# Patient Record
Sex: MALE | Race: WHITE | NOT HISPANIC OR LATINO | URBAN - METROPOLITAN AREA
[De-identification: names, ages, dates, MRNs, and addresses within clinical notes are randomized per-mention and may not be internally consistent; named-entity substitution may affect disease eponyms.]

---

## 2019-04-18 ENCOUNTER — TRANSFERRED RECORDS (OUTPATIENT)
Dept: HEALTH INFORMATION MANAGEMENT | Facility: CLINIC | Age: 44
End: 2019-04-18

## 2019-10-15 ENCOUNTER — TRANSFERRED RECORDS (OUTPATIENT)
Dept: HEALTH INFORMATION MANAGEMENT | Facility: CLINIC | Age: 44
End: 2019-10-15

## 2019-12-20 ENCOUNTER — TRANSFERRED RECORDS (OUTPATIENT)
Dept: HEALTH INFORMATION MANAGEMENT | Facility: CLINIC | Age: 44
End: 2019-12-20

## 2021-11-04 ENCOUNTER — TRANSFERRED RECORDS (OUTPATIENT)
Dept: HEALTH INFORMATION MANAGEMENT | Facility: CLINIC | Age: 46
End: 2021-11-04

## 2024-04-11 ENCOUNTER — TRANSFERRED RECORDS (OUTPATIENT)
Dept: HEALTH INFORMATION MANAGEMENT | Facility: CLINIC | Age: 49
End: 2024-04-11

## 2024-04-17 ENCOUNTER — EXTERNAL ORDER RESULTS (OUTPATIENT)
Dept: LAB | Facility: CLINIC | Age: 49
End: 2024-04-17

## 2024-05-16 ENCOUNTER — TRANSFERRED RECORDS (OUTPATIENT)
Dept: HEALTH INFORMATION MANAGEMENT | Facility: CLINIC | Age: 49
End: 2024-05-16

## 2024-05-21 ENCOUNTER — MEDICAL CORRESPONDENCE (OUTPATIENT)
Dept: HEALTH INFORMATION MANAGEMENT | Facility: CLINIC | Age: 49
End: 2024-05-21

## 2024-05-21 ENCOUNTER — REFERRAL (OUTPATIENT)
Dept: TRANSPLANT | Facility: CLINIC | Age: 49
End: 2024-05-21

## 2024-05-21 DIAGNOSIS — Z76.82 ORGAN TRANSPLANT CANDIDATE: ICD-10-CM

## 2024-05-21 DIAGNOSIS — Z76.82 AWAITING ORGAN TRANSPLANT: Primary | ICD-10-CM

## 2024-05-21 DIAGNOSIS — Z01.818 ENCOUNTER FOR PRE-TRANSPLANT EVALUATION FOR KIDNEY AND PANCREAS TRANSPLANT: ICD-10-CM

## 2024-05-21 DIAGNOSIS — N18.5 CHRONIC KIDNEY DISEASE, STAGE V (H): ICD-10-CM

## 2024-05-21 DIAGNOSIS — I10 ESSENTIAL HYPERTENSION: ICD-10-CM

## 2024-05-21 DIAGNOSIS — N18.6 END STAGE RENAL DISEASE (H): ICD-10-CM

## 2024-05-21 DIAGNOSIS — E10.9 TYPE 1 DIABETES MELLITUS WITHOUT COMPLICATION (H): ICD-10-CM

## 2024-05-21 NOTE — LETTER
Saul Metzger  [address] [address]    Dear Saul,    Thank you for your interest in the Transplant Center at [facility]. We look forward to being a part of your care team and assisting you through the transplant process.    As we discussed, your transplant coordinator is Debby Gotti (Pancreas, Kidney).  You may call your coordinator at any time with questions or concerns.  Your first scheduled call will be on 6/11/2024 between 12-3pm.  If this needs to change, call [phone].    Please complete the following.    Fill out and return the enclosed forms  Authorization for Electronic Communication  Authorization to Discuss Protected Health Information  Authorization for Release of Protected Health Information    Sign up for:  Zoomt, access to your electronic medical record (see enclosed pamphlet)  [facility].CupomNow, a transplant education website    You can use these tools to learn more about your transplant, communicate with your care team, and track your medical details           My Transplant Place            Sincerely,      Solid Organ Transplant  [facility]    cc: Referring Physician PCP

## 2024-05-23 ENCOUNTER — TRANSFERRED RECORDS (OUTPATIENT)
Dept: HEALTH INFORMATION MANAGEMENT | Facility: CLINIC | Age: 49
End: 2024-05-23
Payer: COMMERCIAL

## 2024-05-24 ENCOUNTER — TRANSFERRED RECORDS (OUTPATIENT)
Dept: HEALTH INFORMATION MANAGEMENT | Facility: CLINIC | Age: 49
End: 2024-05-24
Payer: COMMERCIAL

## 2024-05-24 VITALS — WEIGHT: 149 LBS | BODY MASS INDEX: 22.58 KG/M2 | HEIGHT: 68 IN

## 2024-05-24 NOTE — TELEPHONE ENCOUNTER
SOT KIDNEY INTAKE   May 24, 2024 5:28 PM - Susie Bai LPN:     PCP: Surjit Pro MD    Referring Organization: [facility]   Referring Provider: Stuart Reid MD  Referring Diagnosis: CKD stage 5, DM1    GFR/Date: pending lab record retrieval     Is patient diabetic? yes   Is patient on insulin? yes  Is patient under the age of 65? yes  Was patient offered a pancreas transplant referral? yes    Previous transplants No  Cancer history: No  Cardiac history: No  Biopsy: Not found   Oxygen use at rest: 0 with activity: 0    BMI: 22.66     Is patient in a group home/assisted living? No  Does patient have a guardian? No    Referral intake process completed.  Patient is aware that after financial approval is received, medical records will be requested.   Patient confirmed for a callback from transplant coordinator on 6/11/2024. (within 2 weeks)  Tentative evaluation date 8/21/2024 slot 2 .    Confirmed coordinator will discuss evaluation process in more detail at the time of their call.   Patient is aware of the need to arrange age appropriate cancer screening, vaccinations, and dental care.  Reminded patient to complete questionnaire, complete medical records release, and review packet prior to evaluation visit .  Assessed patient for special needs (ie-wheelchair, assistance, guardian, and ):  None    Patient instructed to call [phone] with questions.     Patient gave verbal consent during intake call to obtain medical records and documents outside of [facility]:  Yes

## 2024-06-06 ENCOUNTER — TRANSFERRED RECORDS (OUTPATIENT)
Dept: HEALTH INFORMATION MANAGEMENT | Facility: CLINIC | Age: 49
End: 2024-06-06
Payer: COMMERCIAL

## 2024-06-11 ENCOUNTER — TELEPHONE (OUTPATIENT)
Dept: TRANSPLANT | Facility: CLINIC | Age: 49
End: 2024-06-11
Payer: COMMERCIAL

## 2024-06-11 ENCOUNTER — TRANSFERRED RECORDS (OUTPATIENT)
Dept: HEALTH INFORMATION MANAGEMENT | Facility: CLINIC | Age: 49
End: 2024-06-11
Payer: COMMERCIAL

## 2024-06-11 NOTE — TELEPHONE ENCOUNTER
Reviewed pt's chart for pre-kidney/pancreas transplant evaluation planning. Coordinator first call on 6/11/24. PKE STD on Wednesday 8/21/2024 slot #2.      services required: no. Is pt able to attend virtual education class? Has active MyChart    Pt has ESRD d/t DM 1, biopsy none.  Pt is on dialysis, HD recently started mid/end of May, requesting 9528. Pt is type 1 diabetic, on insulin using 6-10 units 2x daily. 30 years+ 6-10 units 2x     *requesting records from [facility] to verify  Health hx: none.    Heart hx: HTN.    Lung hx: none.   Surgical hx: none - HD line placement.      Pt is not/never smoker, does not consume alcohol, and does not use recreational drugs. Does not have current infection,  non-healing wounds, or active cancer.    Health maintenance items: BMI 22.7 on 5/24/24.  Colonoscopy: n/a.  Dental: due.  Vaccinations: UTD.     Pt is independent w/ ADLs.  Pt lives in [place], [place] w/ family.  Has support following transplant. Pt does not have living donors.     Imaging Available: requesting records from [facility]    Contacted patient and introduced myself as their Transplant Coordinator, also introduced the role of the Transplant Coordinator in the transplant process.  Explained the purpose of this call including reviewing next steps and answering questions.      Confirmed Referring Provider, Dr. Prince Raza; Dialysis Center, [facility]; and Primary Care Physician, Dr. Surjit Pro. Explained to the patient of the importance of continued communication with referring providers and primary care physicians.      Reviewed components of transplant evaluation process including necessary appointments, tests, and procedures.  Instructed pt to bring 1-2 people with them to eval and to eat and drink and normally on eval day    Answered questions for patient regarding evaluation, provided my name and contact information and requested they call/message with any additional questions or  concerns.  Informed patient they will receive a letter with information discussed in referral call. Determined that patient would like information regarding transplant by:       Mail, Email, MyChart, and/or Phone Call.  Encouraged the use of APImetricshart.    Informed pt about transplant educational website, asked to watch pre-k/p or sign up for education class prior to evaluation. Link for educational videos were provided.  Additional informational web sites about transplant were discussed. Links provided to www.unos.org and www.srtr.org in letter sent to patient.  Pt expressed understanding of all and were in agreement with the plan.    Confirmed STD Wednesday, August 21 slot #2. Informed pt they will hear from scheduling to arrange appointment times for evaluation day. As well as, receive an email from our Office prior to eval with a Receipt of Info and educational materials - instructed to read materials and sign consent prior to eval. Smartset orders entered.

## 2024-06-14 ENCOUNTER — DOCUMENTATION ONLY (OUTPATIENT)
Dept: TRANSPLANT | Facility: CLINIC | Age: 49
End: 2024-06-14
Payer: COMMERCIAL

## 2024-06-19 ENCOUNTER — DOCUMENTATION ONLY (OUTPATIENT)
Dept: TRANSPLANT | Facility: CLINIC | Age: 49
End: 2024-06-19
Payer: COMMERCIAL

## 2024-07-11 ENCOUNTER — TRANSFERRED RECORDS (OUTPATIENT)
Dept: HEALTH INFORMATION MANAGEMENT | Facility: CLINIC | Age: 49
End: 2024-07-11
Payer: COMMERCIAL

## 2024-07-26 ENCOUNTER — TRANSFERRED RECORDS (OUTPATIENT)
Dept: HEALTH INFORMATION MANAGEMENT | Facility: CLINIC | Age: 49
End: 2024-07-26
Payer: COMMERCIAL

## 2024-07-28 ENCOUNTER — HEALTH MAINTENANCE LETTER (OUTPATIENT)
Age: 49
End: 2024-07-28

## 2024-08-09 ENCOUNTER — TRANSFERRED RECORDS (OUTPATIENT)
Dept: HEALTH INFORMATION MANAGEMENT | Facility: CLINIC | Age: 49
End: 2024-08-09
Payer: COMMERCIAL

## 2024-08-14 ENCOUNTER — TELEPHONE (OUTPATIENT)
Dept: TRANSPLANT | Facility: CLINIC | Age: 49
End: 2024-08-14
Payer: COMMERCIAL

## 2024-08-14 NOTE — TELEPHONE ENCOUNTER
Called pt and left VM confirming PKE for 8/21/24, provided clinic address, check in time, instructed to take medications, no need to fast and bring support person. Provided office line for any follow up questions.

## 2024-08-15 ENCOUNTER — TRANSFERRED RECORDS (OUTPATIENT)
Dept: HEALTH INFORMATION MANAGEMENT | Facility: CLINIC | Age: 49
End: 2024-08-15
Payer: COMMERCIAL

## 2024-08-20 LAB
A1 AB TITR SERPL: 8 {TITER}
A1 AB TITR SERPL: 8 {TITER}
A2 AB TITR SERPL: 1 {TITER}
A2 AB TITR SERPL: <1 {TITER}
ABO/RH(D): NORMAL
ABO/RH(D): NORMAL
ANTIBODY SCREEN: NEGATIVE
ANTIBODY TITER IGM SCREEN: NEGATIVE
SPECIMEN EXPIRATION DATE: NORMAL

## 2024-08-20 NOTE — PROGRESS NOTES
[facility] SOLID ORGAN TRANSPLANT  OUTPATIENT MNT: KIDNEY PANCREAS TRANSPLANT EVALUATION    Current BMI: 22.4 (HT 69 in,  lbs/69 kg)  BMI guideline for kidney transplant up to a BMI of 40 / per surgeon discretion  BMI guideline for pancreas transplant up to a BMI of 35 / per surgeon discretion    Frailty Assessment-- Frail (3/5 points)- unintentional wt loss (pt is regaining), low , low activity (although no reported physical limitations)  **Pt does not appear frail   Handgrip Strength: 25    Reference:  Score of 0-2 = Not Frail  Score of 3-5 = Frail      TIME SPENT: 15 minutes  VISIT TYPE: Initial   REFERRING PHYSICIAN: Savanna   PT ACCOMPANIED BY: his mom     History of previous txp: none   Dialysis: HD 5/2024 930 am     NUTRITION ASSESSMENT  H/o DM I- checks BG via CGM (ranges from 70-low 200s)  Feels low ~50 but CGM alerts before this  Lantus- has been off for a few years d/t lower BG levels.   Humalog- 6 units/meal on the low end, up to 10 units/meal      Last A1c: 6.6 (4/17/24); 5.9 this summer per pt     - Meal prep & grocery shopping: pt's mom- lives with mom   - Previous RD education: yes  - Appetite: improved over time since being ill lately  - Food allergies/intolerances: no  - Issues chewing or swallowing: no  - N/V/D/C: no  - Food access concerns: not asked     Vitamins, Supplements, Pertinent Meds: Phoslo (takes majority of the time), renal vit, vit D  Herbal Medicines/Supplements: none   Protein Supplements: Fair Life protein drinks after dialysis 3/week    Weight hx // fluid retention:   - no KOLBY  - prev 170, down to 140s with covid but has since regained >10 lbs   - unable to verify wt hx records due to lack of recent records in chart    Wt Readings from Last 10 Encounters:   08/21/24 69 kg (152 lb 3.2 oz)   05/24/24 67.6 kg (149 lb)     PHYSICAL ACTIVITY  No routine activity; no physical limitations     DIET RECALL  Breakfast HD days- cereal w/ whole milk   Lunch Lunch meat s/w;  grilled cheese; grilled PB s/w; canned chix and dumpling soup   Dinner Chicken w/ mashed potatoes; usually has protein at dinner; potato sometimes or rice/noodles   No veggies- some corn, gr beans, beets   Snacks Chips, cookies    Beverages Water, 1 pop/day at the most (Diet Sprite or Dew), milk (24 oz/day)   Alcohol None    Dining out 3x/week      LABS  No recent K/Phos on file since starting dialysis, but pt reports previously high levels have come down to within range    NUTRITION DIAGNOSIS  No nutrition diagnosis identified at this time.     NUTRITION INTERVENTION  Nutrition education provided:  Discussed sodium intake (low sodium foods and drinks, seasoning food without salt and tips for low sodium diet).  Reviewed K/Phos, protein needs being on dialysis.     Reviewed post txp diet guidelines in brief (will review in further detail post txp):  (1) Review of proper food safety measures d/t immunosuppressant therapy post-op and increased risk for food-borne illness    (2) Avoid the following post txp d/t risk for rejection, unknown effects on the organs, and/or potential interactions with immunosuppressants:  - Herbal, Chinese, holistic, chiropractic, natural, alternative medicines and supplements  - Detoxes and cleanses  - Weight loss pills  - Protein powders or other products with extracts or herbs (ie green tea extract)    (3) Med regimen and possible side effects    Patient Understanding: Pt verbalized understanding of education provided.  Expected Engagement: Good  Follow-Up Plans: PRN     NUTRITION GOALS   No nutrition goals identified at this time     Marilu Somers, RD, LD, CCTD

## 2024-08-21 ENCOUNTER — ALLIED HEALTH/NURSE VISIT (OUTPATIENT)
Dept: TRANSPLANT | Facility: CLINIC | Age: 49
End: 2024-08-21
Attending: NURSE PRACTITIONER
Payer: COMMERCIAL

## 2024-08-21 ENCOUNTER — ANCILLARY PROCEDURE (OUTPATIENT)
Dept: CARDIOLOGY | Facility: CLINIC | Age: 49
End: 2024-08-21
Attending: NURSE PRACTITIONER
Payer: COMMERCIAL

## 2024-08-21 ENCOUNTER — LAB (OUTPATIENT)
Dept: LAB | Facility: CLINIC | Age: 49
End: 2024-08-21
Attending: NURSE PRACTITIONER
Payer: COMMERCIAL

## 2024-08-21 ENCOUNTER — ANCILLARY PROCEDURE (OUTPATIENT)
Dept: GENERAL RADIOLOGY | Facility: CLINIC | Age: 49
End: 2024-08-21
Attending: NURSE PRACTITIONER
Payer: COMMERCIAL

## 2024-08-21 ENCOUNTER — ALLIED HEALTH/NURSE VISIT (OUTPATIENT)
Dept: TRANSPLANT | Facility: CLINIC | Age: 49
End: 2024-08-21

## 2024-08-21 VITALS
DIASTOLIC BLOOD PRESSURE: 71 MMHG | SYSTOLIC BLOOD PRESSURE: 114 MMHG | BODY MASS INDEX: 22.54 KG/M2 | OXYGEN SATURATION: 96 % | WEIGHT: 152.2 LBS | HEIGHT: 69 IN | HEART RATE: 83 BPM

## 2024-08-21 DIAGNOSIS — N18.5 CHRONIC KIDNEY DISEASE, STAGE V (H): ICD-10-CM

## 2024-08-21 DIAGNOSIS — N18.6 END STAGE RENAL DISEASE (H): ICD-10-CM

## 2024-08-21 DIAGNOSIS — I10 ESSENTIAL HYPERTENSION: ICD-10-CM

## 2024-08-21 DIAGNOSIS — Z01.818 ENCOUNTER FOR PRE-TRANSPLANT EVALUATION FOR KIDNEY AND PANCREAS TRANSPLANT: ICD-10-CM

## 2024-08-21 DIAGNOSIS — N18.6 END STAGE RENAL DISEASE (H): Primary | ICD-10-CM

## 2024-08-21 DIAGNOSIS — E10.9 TYPE 1 DIABETES MELLITUS WITHOUT COMPLICATION (H): ICD-10-CM

## 2024-08-21 DIAGNOSIS — Z76.82 AWAITING ORGAN TRANSPLANT: Primary | ICD-10-CM

## 2024-08-21 DIAGNOSIS — Z76.82 ORGAN TRANSPLANT CANDIDATE: Primary | ICD-10-CM

## 2024-08-21 DIAGNOSIS — Z76.82 AWAITING ORGAN TRANSPLANT: ICD-10-CM

## 2024-08-21 DIAGNOSIS — E78.2 MIXED HYPERLIPIDEMIA: ICD-10-CM

## 2024-08-21 DIAGNOSIS — Z01.818 ENCOUNTER FOR PRE-TRANSPLANT EVALUATION FOR KIDNEY AND PANCREAS TRANSPLANT: Primary | ICD-10-CM

## 2024-08-21 DIAGNOSIS — Z76.82 ORGAN TRANSPLANT CANDIDATE: ICD-10-CM

## 2024-08-21 DIAGNOSIS — E55.9 VITAMIN D DEFICIENCY: ICD-10-CM

## 2024-08-21 DIAGNOSIS — E87.20 METABOLIC ACIDOSIS: ICD-10-CM

## 2024-08-21 LAB
ALBUMIN SERPL BCG-MCNC: 4.4 G/DL (ref 3.5–5.2)
ALBUMIN UR-MCNC: 10 MG/DL
ALP SERPL-CCNC: 133 U/L (ref 40–150)
ALT SERPL W P-5'-P-CCNC: 23 U/L (ref 0–70)
ANION GAP SERPL CALCULATED.3IONS-SCNC: 14 MMOL/L (ref 7–15)
APPEARANCE UR: CLEAR
APTT PPP: 26 SECONDS (ref 22–38)
AST SERPL W P-5'-P-CCNC: 14 U/L (ref 0–45)
BASOPHILS # BLD AUTO: 0 10E3/UL (ref 0–0.2)
BASOPHILS NFR BLD AUTO: 1 %
BILIRUB SERPL-MCNC: 0.3 MG/DL
BILIRUB UR QL STRIP: NEGATIVE
BUN SERPL-MCNC: 43.5 MG/DL (ref 6–20)
CALCIUM SERPL-MCNC: 8.9 MG/DL (ref 8.8–10.4)
CHLORIDE SERPL-SCNC: 92 MMOL/L (ref 98–107)
COLOR UR AUTO: ABNORMAL
CREAT SERPL-MCNC: 6.68 MG/DL (ref 0.67–1.17)
EGFRCR SERPLBLD CKD-EPI 2021: 10 ML/MIN/1.73M2
EOSINOPHIL # BLD AUTO: 0.2 10E3/UL (ref 0–0.7)
EOSINOPHIL NFR BLD AUTO: 3 %
ERYTHROCYTE [DISTWIDTH] IN BLOOD BY AUTOMATED COUNT: 15.4 % (ref 10–15)
FACTOR 2 INTERPRETATION: NORMAL
FACTOR V INTERPRETATION: NORMAL
GLUCOSE SERPL-MCNC: 211 MG/DL (ref 70–99)
GLUCOSE UR STRIP-MCNC: 300 MG/DL
HBA1C MFR BLD: 6.3 %
HBV CORE AB SERPL QL IA: NONREACTIVE
HBV SURFACE AB SERPL IA-ACNC: 32 M[IU]/ML
HBV SURFACE AB SERPL IA-ACNC: REACTIVE M[IU]/ML
HBV SURFACE AG SERPL QL IA: REACTIVE
HCO3 SERPL-SCNC: 29 MMOL/L (ref 22–29)
HCT VFR BLD AUTO: 40 % (ref 40–53)
HCV AB SERPL QL IA: NONREACTIVE
HGB BLD-MCNC: 13.3 G/DL (ref 13.3–17.7)
HGB UR QL STRIP: NEGATIVE
HIV 1+2 AB+HIV1 P24 AG SERPL QL IA: NONREACTIVE
IMM GRANULOCYTES # BLD: 0 10E3/UL
IMM GRANULOCYTES NFR BLD: 0 %
INR PPP: 0.99 (ref 0.85–1.15)
KETONES UR STRIP-MCNC: NEGATIVE MG/DL
LAB DIRECTOR COMMENTS: NORMAL
LAB DIRECTOR DISCLAIMER: NORMAL
LAB DIRECTOR INTERPRETATION: NORMAL
LAB DIRECTOR METHODOLOGY: NORMAL
LAB DIRECTOR RESULTS: NORMAL
LEUKOCYTE ESTERASE UR QL STRIP: NEGATIVE
LOCATION OF TASK: NORMAL
LVEF ECHO: NORMAL
LYMPHOCYTES # BLD AUTO: 1.8 10E3/UL (ref 0.8–5.3)
LYMPHOCYTES NFR BLD AUTO: 30 %
MCH RBC QN AUTO: 30.9 PG (ref 26.5–33)
MCHC RBC AUTO-ENTMCNC: 33.3 G/DL (ref 31.5–36.5)
MCV RBC AUTO: 93 FL (ref 78–100)
MONOCYTES # BLD AUTO: 0.7 10E3/UL (ref 0–1.3)
MONOCYTES NFR BLD AUTO: 11 %
NEUTROPHILS # BLD AUTO: 3.5 10E3/UL (ref 1.6–8.3)
NEUTROPHILS NFR BLD AUTO: 55 %
NITRATE UR QL: NEGATIVE
NRBC # BLD AUTO: 0 10E3/UL
NRBC BLD AUTO-RTO: 0 /100
PH UR STRIP: 7.5 [PH] (ref 5–7)
PLATELET # BLD AUTO: 149 10E3/UL (ref 150–450)
POTASSIUM SERPL-SCNC: 4.6 MMOL/L (ref 3.4–5.3)
PROT SERPL-MCNC: 7.2 G/DL (ref 6.4–8.3)
RBC # BLD AUTO: 4.3 10E6/UL (ref 4.4–5.9)
RBC URINE: <1 /HPF
SODIUM SERPL-SCNC: 135 MMOL/L (ref 135–145)
SP GR UR STRIP: 1 (ref 1–1.03)
SPECIMEN DESCRIPTION: NORMAL
SQUAMOUS EPITHELIAL: <1 /HPF
T PALLIDUM AB SER QL: NONREACTIVE
UROBILINOGEN UR STRIP-MCNC: NORMAL MG/DL
WBC # BLD AUTO: 6.2 10E3/UL (ref 4–11)
WBC URINE: 0 /HPF

## 2024-08-21 PROCEDURE — 86803 HEPATITIS C AB TEST: CPT | Performed by: NURSE PRACTITIONER

## 2024-08-21 PROCEDURE — 81001 URINALYSIS AUTO W/SCOPE: CPT | Performed by: NURSE PRACTITIONER

## 2024-08-21 PROCEDURE — 84681 ASSAY OF C-PEPTIDE: CPT | Performed by: NURSE PRACTITIONER

## 2024-08-21 PROCEDURE — 84155 ASSAY OF PROTEIN SERUM: CPT | Performed by: NURSE PRACTITIONER

## 2024-08-21 PROCEDURE — 83036 HEMOGLOBIN GLYCOSYLATED A1C: CPT | Performed by: NURSE PRACTITIONER

## 2024-08-21 PROCEDURE — 81378 HLA I & II TYPING HR: CPT | Performed by: NURSE PRACTITIONER

## 2024-08-21 PROCEDURE — 82040 ASSAY OF SERUM ALBUMIN: CPT | Performed by: NURSE PRACTITIONER

## 2024-08-21 PROCEDURE — 99205 OFFICE O/P NEW HI 60 MIN: CPT | Performed by: NURSE PRACTITIONER

## 2024-08-21 PROCEDURE — 85390 FIBRINOLYSINS SCREEN I&R: CPT | Mod: 26 | Performed by: PATHOLOGY

## 2024-08-21 PROCEDURE — 71046 X-RAY EXAM CHEST 2 VIEWS: CPT | Performed by: RADIOLOGY

## 2024-08-21 PROCEDURE — 86780 TREPONEMA PALLIDUM: CPT | Performed by: NURSE PRACTITIONER

## 2024-08-21 PROCEDURE — G0452 MOLECULAR PATHOLOGY INTERPR: HCPCS | Mod: 26 | Performed by: PATHOLOGY

## 2024-08-21 PROCEDURE — 99213 OFFICE O/P EST LOW 20 MIN: CPT | Performed by: TRANSPLANT SURGERY

## 2024-08-21 PROCEDURE — 86481 TB AG RESPONSE T-CELL SUSP: CPT | Performed by: NURSE PRACTITIONER

## 2024-08-21 PROCEDURE — 93000 ELECTROCARDIOGRAM COMPLETE: CPT | Performed by: INTERNAL MEDICINE

## 2024-08-21 PROCEDURE — 36415 COLL VENOUS BLD VENIPUNCTURE: CPT | Performed by: NURSE PRACTITIONER

## 2024-08-21 PROCEDURE — 86886 COOMBS TEST INDIRECT TITER: CPT | Performed by: NURSE PRACTITIONER

## 2024-08-21 PROCEDURE — 81240 F2 GENE: CPT | Performed by: NURSE PRACTITIONER

## 2024-08-21 PROCEDURE — 86644 CMV ANTIBODY: CPT | Performed by: NURSE PRACTITIONER

## 2024-08-21 PROCEDURE — 86704 HEP B CORE ANTIBODY TOTAL: CPT | Performed by: NURSE PRACTITIONER

## 2024-08-21 PROCEDURE — 86833 HLA CLASS II HIGH DEFIN QUAL: CPT | Performed by: NURSE PRACTITIONER

## 2024-08-21 PROCEDURE — 85730 THROMBOPLASTIN TIME PARTIAL: CPT | Performed by: NURSE PRACTITIONER

## 2024-08-21 PROCEDURE — 86665 EPSTEIN-BARR CAPSID VCA: CPT | Performed by: NURSE PRACTITIONER

## 2024-08-21 PROCEDURE — 85025 COMPLETE CBC W/AUTO DIFF WBC: CPT | Performed by: NURSE PRACTITIONER

## 2024-08-21 PROCEDURE — 85670 THROMBIN TIME PLASMA: CPT | Performed by: NURSE PRACTITIONER

## 2024-08-21 PROCEDURE — 86706 HEP B SURFACE ANTIBODY: CPT | Performed by: NURSE PRACTITIONER

## 2024-08-21 PROCEDURE — 85610 PROTHROMBIN TIME: CPT | Performed by: NURSE PRACTITIONER

## 2024-08-21 PROCEDURE — 86787 VARICELLA-ZOSTER ANTIBODY: CPT | Performed by: NURSE PRACTITIONER

## 2024-08-21 PROCEDURE — 93306 TTE W/DOPPLER COMPLETE: CPT | Mod: GC | Performed by: INTERNAL MEDICINE

## 2024-08-21 PROCEDURE — 86900 BLOOD TYPING SEROLOGIC ABO: CPT | Performed by: NURSE PRACTITIONER

## 2024-08-21 PROCEDURE — 86832 HLA CLASS I HIGH DEFIN QUAL: CPT | Performed by: NURSE PRACTITIONER

## 2024-08-21 PROCEDURE — 86147 CARDIOLIPIN ANTIBODY EA IG: CPT | Performed by: NURSE PRACTITIONER

## 2024-08-21 PROCEDURE — 99207 PR NO CHARGE COORDINATED CARE PS: CPT

## 2024-08-21 PROCEDURE — 99204 OFFICE O/P NEW MOD 45 MIN: CPT | Performed by: TRANSPLANT SURGERY

## 2024-08-21 PROCEDURE — 87340 HEPATITIS B SURFACE AG IA: CPT | Performed by: NURSE PRACTITIONER

## 2024-08-21 RX ORDER — KETOROLAC TROMETHAMINE 30 MG/ML
INJECTION, SOLUTION INTRAMUSCULAR; INTRAVENOUS SEE ADMIN INSTRUCTIONS
COMMUNITY
Start: 2024-08-08

## 2024-08-21 RX ORDER — ROSUVASTATIN CALCIUM 20 MG/1
1 TABLET, COATED ORAL
COMMUNITY
Start: 2024-07-11

## 2024-08-21 RX ORDER — METHOXY POLYETHYLENE GLYCOL-EPOETIN BETA 120 UG/.3ML
INJECTION, SOLUTION INTRAVENOUS
COMMUNITY

## 2024-08-21 RX ORDER — VIT B COMP NO.3/FOLIC/C/BIOTIN 1 MG-60 MG
1 TABLET ORAL DAILY
COMMUNITY

## 2024-08-21 RX ORDER — BLOOD-GLUCOSE SENSOR
EACH MISCELLANEOUS
COMMUNITY
Start: 2024-08-07

## 2024-08-21 RX ORDER — BLOOD SUGAR DIAGNOSTIC
STRIP MISCELLANEOUS
COMMUNITY
Start: 2024-08-07

## 2024-08-21 RX ORDER — PEN NEEDLE, DIABETIC 32GX 5/32"
NEEDLE, DISPOSABLE MISCELLANEOUS
COMMUNITY
Start: 2024-08-09

## 2024-08-21 RX ORDER — CALCIUM ACETATE 667 MG/1
CAPSULE ORAL
COMMUNITY
Start: 2024-07-12

## 2024-08-21 RX ORDER — INSULIN LISPRO 100 [IU]/ML
INJECTION, SOLUTION INTRAVENOUS; SUBCUTANEOUS
COMMUNITY
Start: 2024-08-07

## 2024-08-21 RX ORDER — SODIUM BICARBONATE 650 MG/1
1 TABLET ORAL
COMMUNITY
Start: 2024-05-31

## 2024-08-21 RX ORDER — VITAMIN B COMPLEX
TABLET ORAL DAILY
COMMUNITY

## 2024-08-21 RX ORDER — INSULIN GLARGINE 100 [IU]/ML
INJECTION, SOLUTION SUBCUTANEOUS
COMMUNITY
Start: 2024-08-07

## 2024-08-21 RX ORDER — LISINOPRIL 20 MG/1
20 TABLET ORAL
COMMUNITY
Start: 2024-07-11

## 2024-08-21 NOTE — LETTER
8/21/2024      Saul Metzger  [address] [address]      Dear Colleague,    Thank you for referring your patient, Saul Metzger, to the [facility] TRANSPLANT [facility]. Please see a copy of my visit note below.    TRANSPLANT NEPHROLOGY RECIPIENT EVALUATION NOTE    Recommendations:  - Will need risk assessment with coronary angiogram given 20 + years of diabetes.    - CT a/p   - repeat cardiolipins   - dental     Assessment and Plan:  # Kidney/Pancreas Transplant Evaluation: Patient is a good candidate overall. Benefits of a living donor transplant were discussed.    # ESKD from presumed diabetes mellitus type 1: doing OK on hemodialysis since 6/2024, he would likely benefit from a kidney transplant.     # DM Type 1: A1c ~ 6.3% using Novolog 6-10 units 2-3 times daily,  Lantus prn. Given evidence of end-organ damage, he may benefit from a pancreas transplant.      # Cardiac Risk: no known history of cardiac disease. Will need risk assessment with coronary angiogram given 20 + years of diabetes.      # PAD Screening: will need CT a/p     # Weak positive cardiolipins: will repeat.     # Health Maintenance: Dental: Not up to date    - Discussed the risks and benefits of a transplant, including the risk of surgery and immunosuppression medications.  Patient's overall evaluation will be discussed in the Transplant Program's regular meeting with a final recommendation on the patients suitability for transplant to be made at that time.    Pending completion of the full evaluation, patient presently appears to be enough of an acceptable kidney transplant recipient candidate to have any potential kidney donors start the evaluation process.    Evaluation:  Saul Metzger was seen in consultation at the request of Dr. Stephen Corrales for evaluation as a potential kidney/pancreas transplant recipient.    Reason for Visit:  Saul Metzger is a 48 year old male with ESKD from diabetes mellitus type  1, who presents for kidney/pancreas transplant evaluation.    History of Present Illness:           Kidney Disease Hx:        H/o CKD stage 3/proteinuria since in 2019. Followed by Dr. Choudhary. 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  Initiated hemodialysis in 6/2024.        Kidney Disease Dx: Diabetes mellitus type 1       Biopsy Proven: No         On Dialysis: Yes, Date initiated: 6/2024  and Dialysis Type: [facility] HD; using chest cath        Primary Nephrologist: Dr. Choudhary        H/o Kidney Stones: No       H/o Recurrent/Frequent UTI: No         Diabetic Hx: Type 1        Diagnosis Date: 1989       Medication History: using Novolog 6-10 units 2-3 times daily,  Lantus prn. Better control for the past few years, Following with endo.        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         Functional Capacity/Frailty:        Working as a . Planning to start disability soon. No limitations with walking. No exertional symptoms with stairs.     Fatigue/Decreased Energy: [x] No [] Yes    Chest Pain or SOB with Exertion: [x] No [] Yes    Significant Weight Change: [] No [x] Yes Lost 25 lbs with COVID a few years ago   Nausea, Vomiting or Diarrhea: [x] No [] Yes    Fever, Sweats or Chills:  [x]  No [] Yes    Leg Swelling [x] No [] Yes        History of Cancer: None    Other Significant Medical Issues: None      Allergy Testing Questions:   Medication that caused a reaction None   Antibiotics used that didn't give an allergic reaction?  Patient doesn't know    COVID Vaccination Up To Date: Not asked    Potential Living Kidney Donors: No    Review of Systems:  A comprehensive review of systems was obtained and negative, except as noted in the HPI or PMH.    Past Medical History:   Medical record was reviewed and PMH was discussed with patient and noted below.  Past Medical History:   Diagnosis Date     Diabetes (H)      Hypertension        Past Social History:   No past surgical history on file.  Personal history of bleeding or anesthesia problems: No    Family History:  No family history on file.    Personal History:   Social History     Socioeconomic History     Marital status: Single     Spouse name: Not on file     Number of children: Not on file     Years of education: Not on file     Highest education level: Not on file   Occupational History     Not on file   Tobacco Use     Smoking status: Never     Smokeless tobacco: Never   Substance and Sexual Activity     Alcohol use: Never     Drug use: Never     Sexual activity: Not on file   Other Topics Concern     Parent/sibling w/ CABG, MI or angioplasty before 65F 55M? Yes     Comment: Father multiple valves replaced and has a place maker   Social History Narrative     Not on file     Social Determinants of Health     Financial Resource Strain: Not on file   Food Insecurity: Not on file   Transportation Needs: Not on file   Physical Activity: Not on file   Stress: Not on file   Social Connections: Not on file   Interpersonal Safety: Not on file   Housing Stability: Not on file       Allergies:  No Known Allergies    Medications:  No current outpatient medications on file.     No current facility-administered medications for this visit.       Vitals:  There  were no vitals taken for this visit.    Exam:  GENERAL APPEARANCE: alert and no distress  HENT: mouth without ulcers or lesions  RESP: lungs clear to auscultation - no rales, rhonchi or wheezes  CV: regular rhythm, normal rate, no rub, no murmur  EDEMA: no LE edema bilaterally  ABDOMEN: soft, nondistended, nontender, bowel sounds normal  MS: extremities normal - no gross deformities noted, no evidence of inflammation in joints, no muscle tenderness  SKIN: no rash    Results:   No results found for this or any previous visit (from the past 336 hour(s)).    60 minutes spent on the date of the encounter doing chart review, history and exam, documentation and further activities per the note.         Again, thank you for allowing me to participate in the care of your patient.        Sincerely,        ROBINSON Holden CNP

## 2024-08-21 NOTE — PROGRESS NOTES
Psychosocial Assessment For Kidney/Pancreas Transplantation  Patient Name/ Age: Saul Metzger 48 year old   Medical Record #: [id number]  Duration of Interview:     35 min  Process:   Face-to-Face Interview                (counseling < 50%)   Present at Appointment: Saul and his mom, Neva         : [facility] Date:  August 21, 2024        Type of transplant: K/P    Donor type:      Cadaver   Prior Transplants:    No Status of Transplant:           Current Living Situation    Location:   [address] [place] [address]  With Whom: lives with their family       Family/ Social Support:    Saul lives with his mom, Neva and dad, Joshua in [place], [place]. His 20 yo sonFinn and daughter Lubna are also in and out of the home.  available, helpful   Committed Relationship:     Stable/Supportive   Other Supports:    none       Activities/ Functional Ability    Current Level: ambulatory, visually impaired, and independent with ADL's     Transportation drives self       Vocational/Employment/Financial     Employment   full time. Saul reports he is in process of applying for SSDI.    Job Description       Income   Salary/wages   Insurance      At this time, patient can afford medication costs:  Yes  [employer]       Medical Status    Current Mode of Treatment for ESRD Dialysis   Complications Fatigue        Behavioral    Tobacco Use No Chemical Dependency No         Psychiatric Impairment No      Reading Ability: Good  Education Level: Technical College Recent Legal History No      Coping Style/Strategies: spend time with family, rest        Ability to Adhere to Complex Medical Regime: Yes     Adherence History: compliant         Education  _X_ Medicare  _X_ Rehabilitation  _X_ Donor issues  _X_ Community resources  _X_ Post discharge housing  _X_ Financial resources  _X_ Medical insurance options  _X_ Psych adjustment  _X_ Family adjustment  _X_ Health Care Directive Provided  Education   Psychosocial Risks of Transplant Reviewed and Discussed:  _X_ Increased stress related to emotional,            family, social, employment or financial           situation  _X_ Effect on work and/or disability benefits  _X_ Effect on future health and life           insurance  _X_ Transplant outcome expectations may           not be met  _X_ Mental Health Risks: anxiety,           depression, PTSD, guilt, grief and           chronic fatigue     Notable Items:   Resources for lodging/shuttle information provided.       Final Evaluation/Assessment   Patient seemed to process information well. Appeared well informed, motivated and able to follow post transplant requirements. Behavior was appropriate during interview. Has adequate income and insurance coverage. Adequate social support. No major contraindications noted for transplant.  At this time patient appears to understand the risks and benefits of transplant.      Recommendation  Acceptable    Selection Criteria Met:  Plan for support Yes   Chemical Dependence Yes   Smoking Yes   Mental Health Yes   Adequate Finances Yes    Signature: [facility]   Title: Clinical

## 2024-08-21 NOTE — LETTER
2024      Saul Metzger  [address] [address]      Dear Colleague,    Thank you for referring your patient, Saul Metzger, to the [facility]. Please see a copy of my visit note below.    Transplant Surgery Consult Note PROVISIONAL     ( RECOMMEND RE CONSULTATION AGAIN WITH A PANCREAS SURGEON)    Medical record number: [id number]  YOB: 1975,   Consult requested by Dr. Michell DILL  for evaluation of kidney and pancreas transplant candidacy.    Assessment and Recommendations: Mr. Metzger is a excellent candidate for transplantation and has a GOOD understanding of the risks and benefits of this approach to management of renal failure and diabetes. The following issues should be addressed prior to transplant:     Mr. Metzger has Chronic renal failure due to diabetes mellitus type 1 whose condition is not expected to resolve, is expected to progress, and is expected to continue to develop related comorbid conditions.  Cardiology consult for cardiac risk stratification to be ordered: Yes  CT abdomen and pelvis without contrast to be ordered for assessment of vascular targets: Yes  Transplant listing labs ordered to include HLA, ABOx2, Cr, etc.  Dietician consult ordered: Yes  Social work consult ordered: Yes  Imaging reports reviewed:  NO  Radiology images reviewed:NO  Recipient suitable to move forward with work up of living donors:  Yes      The majority of our visit was spent in counselling, discussing the medical and surgical risks of living or  donor kidney and pancreas transplantation, either in a simultaneous or sequential fashion. I contrasted approximate wait time for SPK vs living vs  donor kidneys from normal (0-85%) or higher (%) kidney donor profile index (KDPI) donors and their associated outcomes. I would not recommend this individual to consider kidneys from high KDPI donors. The reason for this decision is best summarized  Subsequent Inpatient Encounter                                       Detox Unit    SIOBHAN YAP   64y   Male      Chief Complaint:    Follow up for Alcohol  Dependency    HPI:     I reviewed previous notes. No Change, except if noted below.             Detail:_    ROS:   I reviewed with patient.  No changes from previous notes except if noted below.             Detail: _    PFSH I reviewed with patient. No changes from previous notes except if noted below.             Detail_    Medication reconciliation performed.    MEDICATIONS  (STANDING):  multivitamin/minerals 1 Tablet(s) Oral daily  naltrexone 50 milliGRAM(s) Oral daily  nicotine - 21 mG/24Hr(s) Patch 1 Patch Transdermal daily  pantoprazole    Tablet 40 milliGRAM(s) Oral before breakfast  sodium chloride 0.9% Bolus 1000 milliLiter(s) IV Bolus once  thiamine 100 milliGRAM(s) Oral daily      MEDICATIONS  (PRN):  acetaminophen   Tablet .. 650 milliGRAM(s) Oral every 4 hours PRN Temp greater or equal to 38C (100.4F), Mild Pain (1 - 3)  aluminum hydroxide/magnesium hydroxide/simethicone Suspension 30 milliLiter(s) Oral every 6 hours PRN Heartburn  bismuth subsalicylate Liquid 30 milliLiter(s) Oral every 6 hours PRN Diarrhea  chlordiazePOXIDE 25 milliGRAM(s) Oral every 2 hours PRN CIWA-Ar score  8 - 15  chlordiazePOXIDE 50 milliGRAM(s) Oral every 1 hour PRN CIWA-Ar score GREATER THAN 15  cloNIDine 0.1 milliGRAM(s) Oral every 6 hours PRN Blood Pressure GREATER THAN 140/90 mmHG  guaifenesin/dextromethorphan  Syrup 5 milliLiter(s) Oral every 4 hours PRN Cough  hydrOXYzine hydrochloride 50 milliGRAM(s) Oral every 6 hours PRN Anxiety  hydrOXYzine hydrochloride 100 milliGRAM(s) Oral at bedtime PRN insomnia  ibuprofen  Tablet. 400 milliGRAM(s) Oral every 6 hours PRN Mild Pain (1 - 3)  magnesium hydroxide Suspension 30 milliLiter(s) Oral once PRN Constipation  methocarbamol 500 milliGRAM(s) Oral every 6 hours PRN muscle pain  ondansetron   Disintegrating Tablet 4 milliGRAM(s) Oral every 6 hours PRN Nausea and/or Vomiting  pseudoephedrine 60 milliGRAM(s) Oral every 6 hours PRN Rhinitis  vitamin A &amp; D Ointment 1 Application(s) Topical every 8 hours PRN irritated skin      T(C): 35.9 (01-31-20 @ 06:00), Max: 36.3 (01-31-20 @ 00:00)  HR: 80 (01-31-20 @ 06:00) (70 - 80)  BP: 139/68 (01-31-20 @ 06:00) (135/64 - 168/80)  RR: 16 (01-31-20 @ 06:00) (14 - 16)  SpO2: --    PHYSICAL EXAM:      Constitutional: NAD, A&O x3    Eyes: PERRLA, no conjuctivitis    Neck: no lymphadenopathy    Respiratory: +air entry, no rales, no rhonchi, no wheezes    Cardiovascular: +S1 and S2, regular rate and rhythm    Gastrointestinal: +BS, soft, non-tender, not distended    Extremities:  no edema, no calf tenderness    Skin: no rashes, normal turgor      01-30    144  |  104  |  51<H>  ----------------------------<  93  4.8   |  25  |  3.1<H>    Ca    9.4      30 Jan 2020 07:38      Ammonia, Serum: 28 umol/L (01-28-20 @ 20:30)  Amylase, Serum Total: 134 U/L (01-28-20 @ 20:30)  Treponema Pallidum Antibody Interpretation: Positive (01-28-20 @ 20:30)  Hepatitis B Surface Antigen: Nonreact (01-28-20 @ 20:30)  Hepatitis C Virus S/CO Ratio: 0.17 S/CO (01-30-20 @ 07:38)  Hepatitis C Virus S/CO Ratio: 0.18 S/CO (01-28-20 @ 20:30)    Hepatitis C Virus Interpretation: Nonreact (01-30-20 @ 07:38)  Hepatitis C Virus Interpretation: Nonreact (01-28-20 @ 20:30)      Drug Screen 1, Urine Result: Done (01-29-20 @ 00:00)        Impression and Plan:    Primary Diagnosis:  Alcohol Dependency                                Medication: Librium Protocol/ CIWA Protocol added naltrexone rx sent    Secondary Diagnosis:                                                                  Medication:    Tertiary Diagnosis:                                                                       Medication      Continue Detox Protocols. Use of PRNS as needed for withdrawal and comfort.    Adjustments to protocols:    Labs/ Tests reviewed.    Tests ordered:     Likely Disposition: __X_Home       ___Rehab       ___Outpatient Program    ___Self Help     _____Other    Estimated Length of stay:__4__ as: multi-organ transplant candidate.  Access to transplant will be impacted by living donor availability and overall candidacy for SPK, as well as the influence of blood type and degree of sensitization. We discussed advantages of preemptive transplant as well as living donor kidney transplant, and graft and patient survival outcomes associated with these options. Potential surgical complications of kidney and pancreas transplantation include bleeding, clotting, infection, wound complications, anastomotic failure and other issues such as cardiac complications, pneumonia, deep venous thrombosis, pulmonary embolism, post transplant diabetes and death. We discussed the need for protocol biopsy of the kidney and the possible need for a ureteral stent (and subsequent removal). We discussed benefits and risks associated with different approaches to exocrine drainage of pancreatic secretions. We also discussed differences in the average length of stay, recovery process, and posttransplant lab and monitoring protocol. We discussed the risk of graft rejection and recurrent diabetic nephropathy in the setting of poor glycemic control. I emphasized the need for strict immunosuppression adherence and the potential for complications of immunosuppression such as skin cancer or lymphoma, as well as a very low but not zero risk of donor-derived disease transmission risks (infection, cancer). Mr. Metzger asked good questions and the patient's candidacy will be reviewed at our Multidisciplinary Selection Committee. Thank you for the opportunity to participate in Mr. Metzger's care.    Total time: 30 minutes  Counselling time: 30 minutes      Stephen Corrales MD, MEDARDO  Professor of Surgery  [employer]  Surgical Director of Liver Transplant Program  Executive Medical Director of Pediatric  Transplantation  ---------------------------------------------------------------------------------------------------    HPI: Mr. Metzger has End stage renal failure due to diabetes mellitus type 1. The patient has had diabetes for 33 years. Management is by Humalog per diabetic educator. The patient usually checks his blood sugar HAS NINA MANY  times/day.  Daily blood glucoses range typically from 70 to 200.  Hypoglyemic unawareness is not an issue.  The diabetes is controlled.    Complications of diabetes include:    Retinopathy:  Yes   Neuropathy: Yes   Gastroparesis:  No    The patient is on dialysis.    Has potential kidney donors:  No.  Interested in participation in paired exchange if a donor is willing: Yes     The patient has the following pertinent history:       No    Yes  Dialysis:    []      [] via:       Blood Transfusion                  []      []  Number of units:   Most recently:  Pregnancy:    []      [] Number:       Previous Transplant:  []      [] Details:    Cancer    []      [] Comment:   Kidney stones   []      [] Comment:      Recurrent infections  []      []  Type:                  Bladder dysfunction  []      [] Cause:    Claudication   []      [] Distance:    Previous Amputation  []      [] Cause:     Chronic anticoagulation  []      [] Indication:  Orthodoxy  []      []     Past Medical History:   Diagnosis Date     Diabetes (H)      Hypertension      No past surgical history on file.  No family history on file.  Social History     Socioeconomic History     Marital status: Single     Spouse name: Not on file     Number of children: Not on file     Years of education: Not on file     Highest education level: Not on file   Occupational History     Not on file   Tobacco Use     Smoking status: Never     Smokeless tobacco: Never   Substance and Sexual Activity     Alcohol use: Never     Drug use: Never     Sexual activity: Not on file   Other Topics Concern     Parent/sibling w/  CABG, MI or angioplasty before 65F 55M? Yes     Comment: Father multiple valves replaced and has a place maker   Social History Narrative     Not on file     Social Determinants of Health     Financial Resource Strain: Not on file   Food Insecurity: Not on file   Transportation Needs: Not on file   Physical Activity: Not on file   Stress: Not on file   Social Connections: Not on file   Interpersonal Safety: Not on file   Housing Stability: Not on file       ROS:   CONSTITUTIONAL:  No fevers or chills  EYES: negative for icterus  ENT:  negative for hearing loss, tinnitus and sore throat  RESPIRATORY:  negative for cough, sputum, dyspnea  CARDIOVASCULAR:  negative for chest pain Fatigue  GASTROINTESTINAL:  negative for nausea, vomiting, diarrhea or constipation  GENITOURINARY:  negative for incontinence, dysuria, bladder emptying problems  HEME:  No easy bruising  INTEGUMENT:  negative for rash and pruritus  NEURO:  Negative for headache, seizure disorder    Allergies:   No Known Allergies    Medications:  Prescription Medications as of 8/21/2024         Rx Number Disp Refills Start End Last Dispensed Date Next Fill Date Owning Pharmacy    ACCU-CHEK GUIDE test strip  -- -- 8/7/2024 --       Sig: USE 1 STRIP FOUR TIMES DAILY FOR TESTING    Class: Historical    BD PEN NEEDLE ANTOINETTE 2ND GEN 32G X 4 MM miscellaneous  -- -- 8/9/2024 --       Sig: USE FOUR TIMES DAILY TO INJECT INSULIN.    Class: Historical    calcium acetate (PHOSLO) 667 MG CAPS capsule  -- -- 7/12/2024 --       Sig: TAKE 3 CAPSULES BY MOUTH THREE TIMES DAILY WITH MEALS    Class: Historical    Continuous Glucose  (FREESTYLE NINA 3 READER) NA  -- -- 8/8/2024 --       Sig: See Admin Instructions.    Class: Historical    Continuous Glucose Sensor (FREESTYLE NINA 3 SENSOR[place] [facility] [place]  -- -- 8/7/2024 --       Sig: USE FOR GLUCOSE MONITORING CHANGE EVERY 14 DAYS    Class: Historical    insulin lispro (HUMALOG KWIKPEN) 100 UNIT/ML (1 unit dial) KWIKPEN  -- --  "2024 --       Sig: ADMINISTER 10 UNITS UNDER THE SKIN THREE TIMES DAILY    Class: Historical    LANTUS SOLOSTAR 100 UNIT/ML soln  -- -- 2024 --       Sig: ADMINISTER 6 UNITS UNDER THE SKIN EVERY EVENING    Class: Historical    lisinopril (ZESTRIL) 20 MG tablet  -- -- 2024 --       Si mg.    Class: Historical    Methoxy PEG-Epoetin Beta (MIRCERA) 120 MCG/0.3ML SOSY  -- --  --       Sig: Inject as directed.    Class: Historical    Route: Injection    multivitamin RENAL (RENAVITE RX/NEPHROVITE) 1 tablet tablet  -- --  --       Sig: Take 1 tablet by mouth daily.    Class: Historical    Route: Oral    rosuvastatin (CRESTOR) 20 MG tablet  -- -- 2024 --       Sig: Take 1 tablet by mouth daily at 2 pm.    Class: Historical    Route: Oral    sodium bicarbonate 650 MG tablet  -- -- 2024 --       Sig: Take 1 tablet by mouth 2 times daily.    Class: Historical    Route: Oral    study - methoxy PEG-epoetin beta, MIRCERA, (IDS# 5254) 120 MCG/0.3ML injection  -- --  --       Sig: Inject 120 mcg subcutaneously.    Class: Historical    Route: Subcutaneous    Vitamin D3 (VITAMIN D, CHOLECALCIFEROL,) 25 mcg (1000 units) tablet  -- --  --       Sig: Take by mouth daily.    Class: Historical    Route: Oral            Exam:   Pulse:  [83] 83  BP: (114)/(71) 114/71  SpO2:  [96 %] 96 %  Appearance: in no apparent distress.   Skin: normal  Head and Neck: Normal, no rashes or jaundice  Respiratory: easy respirations, no audible wheezing.  Cardiovascular: RRR  Abdomen: protuberant, Normal bowel sounds     Neuro: without deficit     Diagnostics:   No results found for this or any previous visit (from the past 672 hour(s)).  No results found for: \"CPRA\"       Again, thank you for allowing me to participate in the care of your patient.        Sincerely,        Stephen Corrales MD  "

## 2024-08-21 NOTE — NURSING NOTE
"Kidney Transplant Evaluation     Participants were informed of the benefits of transplant as well as potential risks such as infection, cancer, and death.  The need for total adherence with immunosuppression medications and following transplant regimens was stressed.  The overall evaluation/approval/listing process was reviewed.        The patient was provided with the following documents:  What You Need to Know About a Kidney Transplant  Adult Kidney Transplant - A Guide for Patients  SRTR Data Sheet - Kidney  Brochure - Kidney Allocation  What You Need to Know About a Pancreas Transplant  Adult Pancreas Transplant-A Guide for Patients  SRTR Data Sheet - Pancreas  Brochure - Pancreas  Brochure - Multiple Listing and Waiting Time Transfer  What Every Patient Needs to Know (UNOS)  KDPI Consent    Signed the  Receipt of Information for Organ Transplant Recipient.\" He was provided transplant coordinator's business card and instructed to call with additional questions.      Summary    Team s concerns/comments: Will need to see a pancreas surgeon, complete cards and imaging to be reviewed.    Candidacy category: No data was found    Action/Plan: Continue with evaluation    Expected Selection Meeting Discussion: Wednesday, 8/28/24    "

## 2024-08-21 NOTE — PROGRESS NOTES
Transplant Surgery Consult Note PROVISIONAL     ( RECOMMEND RE CONSULTATION AGAIN WITH A PANCREAS SURGEON)    Medical record number: [id number]  YOB: 1975,   Consult requested by Dr. Michell DILL  for evaluation of kidney and pancreas transplant candidacy.    Assessment and Recommendations: Mr. Metzger is a excellent candidate for transplantation and has a GOOD understanding of the risks and benefits of this approach to management of renal failure and diabetes. The following issues should be addressed prior to transplant:     Mr. Metzger has Chronic renal failure due to diabetes mellitus type 1 whose condition is not expected to resolve, is expected to progress, and is expected to continue to develop related comorbid conditions.  Cardiology consult for cardiac risk stratification to be ordered: Yes  CT abdomen and pelvis without contrast to be ordered for assessment of vascular targets: Yes  Transplant listing labs ordered to include HLA, ABOx2, Cr, etc.  Dietician consult ordered: Yes  Social work consult ordered: Yes  Imaging reports reviewed:  NO  Radiology images reviewed:NO  Recipient suitable to move forward with work up of living donors:  Yes      The majority of our visit was spent in counselling, discussing the medical and surgical risks of living or  donor kidney and pancreas transplantation, either in a simultaneous or sequential fashion. I contrasted approximate wait time for SPK vs living vs  donor kidneys from normal (0-85%) or higher (%) kidney donor profile index (KDPI) donors and their associated outcomes. I would not recommend this individual to consider kidneys from high KDPI donors. The reason for this decision is best summarized as: multi-organ transplant candidate.  Access to transplant will be impacted by living donor availability and overall candidacy for SPK, as well as the influence of blood type and degree of sensitization. We discussed advantages of  preemptive transplant as well as living donor kidney transplant, and graft and patient survival outcomes associated with these options. Potential surgical complications of kidney and pancreas transplantation include bleeding, clotting, infection, wound complications, anastomotic failure and other issues such as cardiac complications, pneumonia, deep venous thrombosis, pulmonary embolism, post transplant diabetes and death. We discussed the need for protocol biopsy of the kidney and the possible need for a ureteral stent (and subsequent removal). We discussed benefits and risks associated with different approaches to exocrine drainage of pancreatic secretions. We also discussed differences in the average length of stay, recovery process, and posttransplant lab and monitoring protocol. We discussed the risk of graft rejection and recurrent diabetic nephropathy in the setting of poor glycemic control. I emphasized the need for strict immunosuppression adherence and the potential for complications of immunosuppression such as skin cancer or lymphoma, as well as a very low but not zero risk of donor-derived disease transmission risks (infection, cancer). Mr. Metzger asked good questions and the patient's candidacy will be reviewed at our Multidisciplinary Selection Committee. Thank you for the opportunity to participate in Mr. Metzger's care.    Total time: 30 minutes  Counselling time: 30 minutes      Stephen Corrales MD, MEDARDO  Professor of Surgery  [employer]  Surgical Director of Liver Transplant Program  Executive Medical Director of Pediatric Transplantation  ---------------------------------------------------------------------------------------------------    HPI: Mr. Metzger has End stage renal failure due to diabetes mellitus type 1. The patient has had diabetes for 33 years. Management is by Humalog per diabetic educator. The patient usually checks his blood sugar HAS NINA MANY   times/day.  Daily blood glucoses range typically from 70 to 200.  Hypoglyemic unawareness is not an issue.  The diabetes is controlled.    Complications of diabetes include:    Retinopathy:  Yes   Neuropathy: Yes   Gastroparesis:  No    The patient is on dialysis.    Has potential kidney donors:  No.  Interested in participation in paired exchange if a donor is willing: Yes     The patient has the following pertinent history:       No    Yes  Dialysis:    []      [] via:       Blood Transfusion                  []      []  Number of units:   Most recently:  Pregnancy:    []      [] Number:       Previous Transplant:  []      [] Details:    Cancer    []      [] Comment:   Kidney stones   []      [] Comment:      Recurrent infections  []      []  Type:                  Bladder dysfunction  []      [] Cause:    Claudication   []      [] Distance:    Previous Amputation  []      [] Cause:     Chronic anticoagulation  []      [] Indication:  Islam  []      []     Past Medical History:   Diagnosis Date    Diabetes (H)     Hypertension      No past surgical history on file.  No family history on file.  Social History     Socioeconomic History    Marital status: Single     Spouse name: Not on file    Number of children: Not on file    Years of education: Not on file    Highest education level: Not on file   Occupational History    Not on file   Tobacco Use    Smoking status: Never    Smokeless tobacco: Never   Substance and Sexual Activity    Alcohol use: Never    Drug use: Never    Sexual activity: Not on file   Other Topics Concern    Parent/sibling w/ CABG, MI or angioplasty before 65F 55M? Yes     Comment: Father multiple valves replaced and has a place maker   Social History Narrative    Not on file     Social Determinants of Health     Financial Resource Strain: Not on file   Food Insecurity: Not on file   Transportation Needs: Not on file   Physical Activity: Not on file   Stress: Not on file   Social  Connections: Not on file   Interpersonal Safety: Not on file   Housing Stability: Not on file       ROS:   CONSTITUTIONAL:  No fevers or chills  EYES: negative for icterus  ENT:  negative for hearing loss, tinnitus and sore throat  RESPIRATORY:  negative for cough, sputum, dyspnea  CARDIOVASCULAR:  negative for chest pain Fatigue  GASTROINTESTINAL:  negative for nausea, vomiting, diarrhea or constipation  GENITOURINARY:  negative for incontinence, dysuria, bladder emptying problems  HEME:  No easy bruising  INTEGUMENT:  negative for rash and pruritus  NEURO:  Negative for headache, seizure disorder    Allergies:   No Known Allergies    Medications:  Prescription Medications as of 2024         Rx Number Disp Refills Start End Last Dispensed Date Next Fill Date Owning Pharmacy    ACCU-CHEK GUIDE test strip  -- -- 2024 --       Sig: USE 1 STRIP FOUR TIMES DAILY FOR TESTING    Class: Historical    BD PEN NEEDLE ANTOINETTE 2ND GEN 32G X 4 MM miscellaneous  -- -- 2024 --       Sig: USE FOUR TIMES DAILY TO INJECT INSULIN.    Class: Historical    calcium acetate (PHOSLO) 667 MG CAPS capsule  -- -- 2024 --       Sig: TAKE 3 CAPSULES BY MOUTH THREE TIMES DAILY WITH MEALS    Class: Historical    Continuous Glucose  (FREESTYLE NINA 3 READER) NA  -- -- 2024 --       Sig: See Admin Instructions.    Class: Historical    Continuous Glucose Sensor (FREESTYLE NINA 3 SENSOR) MIS  -- -- 2024 --       Sig: USE FOR GLUCOSE MONITORING CHANGE EVERY 14 DAYS    Class: Historical    insulin lispro (HUMALOG KWIKPEN) 100 UNIT/ML (1 unit dial) KWIKPEN  -- -- 2024 --       Sig: ADMINISTER 10 UNITS UNDER THE SKIN THREE TIMES DAILY    Class: Historical    LANTUS SOLOSTAR 100 UNIT/ML soln  -- -- 2024 --       Sig: ADMINISTER 6 UNITS UNDER THE SKIN EVERY EVENING    Class: Historical    lisinopril (ZESTRIL) 20 MG tablet  -- -- 2024 --       Si mg.    Class: Historical    Methoxy PEG-Epoetin Beta  "(MIRCERA) 120 MCG/0.3ML SOSY  -- --  --       Sig: Inject as directed.    Class: Historical    Route: Injection    multivitamin RENAL (RENAVITE RX/NEPHROVITE) 1 tablet tablet  -- --  --       Sig: Take 1 tablet by mouth daily.    Class: Historical    Route: Oral    rosuvastatin (CRESTOR) 20 MG tablet  -- -- 7/11/2024 --       Sig: Take 1 tablet by mouth daily at 2 pm.    Class: Historical    Route: Oral    sodium bicarbonate 650 MG tablet  -- -- 5/31/2024 --       Sig: Take 1 tablet by mouth 2 times daily.    Class: Historical    Route: Oral    study - methoxy PEG-epoetin beta, MIRCERA, (IDS# 5254) 120 MCG/0.3ML injection  -- --  --       Sig: Inject 120 mcg subcutaneously.    Class: Historical    Route: Subcutaneous    Vitamin D3 (VITAMIN D, CHOLECALCIFEROL,) 25 mcg (1000 units) tablet  -- --  --       Sig: Take by mouth daily.    Class: Historical    Route: Oral            Exam:   Pulse:  [83] 83  BP: (114)/(71) 114/71  SpO2:  [96 %] 96 %  Appearance: in no apparent distress.   Skin: normal  Head and Neck: Normal, no rashes or jaundice  Respiratory: easy respirations, no audible wheezing.  Cardiovascular: RRR  Abdomen: protuberant, Normal bowel sounds     Neuro: without deficit     Diagnostics:   No results found for this or any previous visit (from the past 672 hour(s)).  No results found for: \"CPRA\"   "

## 2024-08-21 NOTE — PROGRESS NOTES
TRANSPLANT NEPHROLOGY RECIPIENT EVALUATION NOTE    Recommendations:  - Will need risk assessment with coronary angiogram given 20 + years of diabetes.    - CT a/p   - repeat cardiolipins   - dental     Assessment and Plan:  # Kidney/Pancreas Transplant Evaluation: Patient is a good candidate overall. Benefits of a living donor transplant were discussed.    # ESKD from presumed diabetes mellitus type 1: doing OK on hemodialysis since 6/2024, he would likely benefit from a kidney transplant.     # DM Type 1: A1c ~ 6.3% using Novolog 6-10 units 2-3 times daily,  Lantus prn. Given evidence of end-organ damage, he may benefit from a pancreas transplant.      # Cardiac Risk: no known history of cardiac disease. Will need risk assessment with coronary angiogram given 20 + years of diabetes.      # PAD Screening: will need CT a/p     # Weak positive cardiolipins: will repeat.     # Health Maintenance: Dental: Not up to date    - Discussed the risks and benefits of a transplant, including the risk of surgery and immunosuppression medications.  Patient's overall evaluation will be discussed in the Transplant Program's regular meeting with a final recommendation on the patients suitability for transplant to be made at that time.    Pending completion of the full evaluation, patient presently appears to be enough of an acceptable kidney transplant recipient candidate to have any potential kidney donors start the evaluation process.    Evaluation:  Saul Metzger was seen in consultation at the request of Dr. Stephen Corrales for evaluation as a potential kidney/pancreas transplant recipient.    Reason for Visit:  Saul Metzger is a 48 year old male with ESKD from diabetes mellitus type 1, who presents for kidney/pancreas transplant evaluation.    History of Present Illness:           Kidney Disease Hx:        H/o CKD stage 3/proteinuria since in 2019. Followed by Dr. Choudhary. 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   Initiated hemodialysis in 6/2024.        Kidney Disease Dx: Diabetes mellitus type 1       Biopsy Proven: No         On Dialysis: Yes, Date initiated: 6/2024  and Dialysis Type: [facility]; using chest cath        Primary Nephrologist: Dr. Choudhary        H/o Kidney Stones: No       H/o Recurrent/Frequent UTI: No         Diabetic Hx: Type 1        Diagnosis Date: 1989       Medication History: using Novolog 6-10 units 2-3 times daily,  Lantus prn. Better control for the past few years, Following with endo.        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         Functional Capacity/Frailty:        Working as a . Planning to start disability soon. No limitations with walking. No exertional symptoms with stairs.     Fatigue/Decreased Energy: [x] No [] Yes    Chest Pain or SOB with Exertion: [x] No [] Yes    Significant Weight Change: [] No [x] Yes Lost 25 lbs with COVID a few years ago   Nausea, Vomiting or Diarrhea: [x] No [] Yes    Fever, Sweats or Chills:  [x] No [] Yes    Leg Swelling [x] No [] Yes        History of Cancer: None    Other Significant Medical Issues: None      Allergy Testing Questions:   Medication that caused a reaction None   Antibiotics used that didn't give an allergic  reaction?  Patient doesn't know    COVID Vaccination Up To Date: Not asked    Potential Living Kidney Donors: No    Review of Systems:  A comprehensive review of systems was obtained and negative, except as noted in the HPI or PMH.    Past Medical History:   Medical record was reviewed and PMH was discussed with patient and noted below.  Past Medical History:   Diagnosis Date    Diabetes (H)     Hypertension        Past Social History:   No past surgical history on file.  Personal history of bleeding or anesthesia problems: No    Family History:  No family history on file.    Personal History:   Social History     Socioeconomic History    Marital status: Single     Spouse name: Not on file    Number of children: Not on file    Years of education: Not on file    Highest education level: Not on file   Occupational History    Not on file   Tobacco Use    Smoking status: Never    Smokeless tobacco: Never   Substance and Sexual Activity    Alcohol use: Never    Drug use: Never    Sexual activity: Not on file   Other Topics Concern    Parent/sibling w/ CABG, MI or angioplasty before 65F 55M? Yes     Comment: Father multiple valves replaced and has a place maker   Social History Narrative    Not on file     Social Determinants of Health     Financial Resource Strain: Not on file   Food Insecurity: Not on file   Transportation Needs: Not on file   Physical Activity: Not on file   Stress: Not on file   Social Connections: Not on file   Interpersonal Safety: Not on file   Housing Stability: Not on file       Allergies:  No Known Allergies    Medications:  No current outpatient medications on file.     No current facility-administered medications for this visit.       Vitals:  There were no vitals taken for this visit.    Exam:  GENERAL APPEARANCE: alert and no distress  HENT: mouth without ulcers or lesions  RESP: lungs clear to auscultation - no rales, rhonchi or wheezes  CV: regular rhythm, normal rate, no rub, no  murmur  EDEMA: no LE edema bilaterally  ABDOMEN: soft, nondistended, nontender, bowel sounds normal  MS: extremities normal - no gross deformities noted, no evidence of inflammation in joints, no muscle tenderness  SKIN: no rash    Results:   No results found for this or any previous visit (from the past 336 hour(s)).    60 minutes spent on the date of the encounter doing chart review, history and exam, documentation and further activities per the note.

## 2024-08-22 LAB
C PEPTIDE SERPL-MCNC: 14.2 NG/ML (ref 0.9–6.9)
CARDIOLIPIN IGG SER IA-ACNC: <2 GPL-U/ML
CARDIOLIPIN IGG SER IA-ACNC: NEGATIVE
CARDIOLIPIN IGM SER IA-ACNC: 11 MPL-U/ML
CARDIOLIPIN IGM SER IA-ACNC: ABNORMAL
CMV IGG SERPL IA-ACNC: <0.2 U/ML
CMV IGG SERPL IA-ACNC: NORMAL
DRVVT SCREEN RATIO: 0.91
EBV VCA IGG SER IA-ACNC: 587 U/ML
EBV VCA IGG SER IA-ACNC: POSITIVE
LA PPP-IMP: NEGATIVE
LUPUS INTERPRETATION: NORMAL
PTT RATIO: 0.91
THROMBIN TIME: 17.8 SECONDS (ref 13–19)
VZV IGG SER QL IA: 3008 INDEX
VZV IGG SER QL IA: POSITIVE

## 2024-08-23 LAB
GAMMA INTERFERON BACKGROUND BLD IA-ACNC: 0 IU/ML
M TB IFN-G BLD-IMP: NEGATIVE
M TB IFN-G CD4+ BCKGRND COR BLD-ACNC: 10 IU/ML
MITOGEN IGNF BCKGRD COR BLD-ACNC: 0.01 IU/ML
MITOGEN IGNF BCKGRD COR BLD-ACNC: 0.02 IU/ML
QUANTIFERON MITOGEN: 10 IU/ML
QUANTIFERON NIL TUBE: 0 IU/ML
QUANTIFERON TB1 TUBE: 0.01 IU/ML
QUANTIFERON TB2 TUBE: 0.02

## 2024-08-26 LAB
A*: NORMAL
A*LOCUS SEROLOGIC EQUIVALENT: 3
A*LOCUS: NORMAL
A*SEROLOGIC EQUIVALENT: 25
ABTEST METHOD: NORMAL
B*: NORMAL
B*LOCUS SEROLOGIC EQUIVALENT: 18
B*LOCUS: NORMAL
B*SEROLOGIC EQUIVALENT: 35
BW-1: NORMAL
C*: NORMAL
C*LOCUS SEROLOGIC EQUIVALENT: 4
C*LOCUS: NORMAL
C*SEROLOGIC EQUIVALENT: 12
DPA1*: NORMAL
DPA1*LOCUS: NORMAL
DPB1*: NORMAL
DPB1*LOCUS: NORMAL
DQA1*: NORMAL
DQA1*LOCUS: NORMAL
DQB1*: NORMAL
DQB1*LOCUS SEROLOGIC EQUIVALENT: 7
DQB1*LOCUS: NORMAL
DQB1*SEROLOGIC EQUIVALENT: 9
DRB1*: NORMAL
DRB1*LOCUS SEROLOGIC EQUIVALENT: 7
DRB1*LOCUS: NORMAL
DRB1*SEROLOGIC EQUIVALENT: 11
DRB3*LOCUS SEROLOGIC EQUIVALENT: 52
DRB3*LOCUS: NORMAL
DRB4*: NORMAL
DRB4*SEROLOGIC EQUIVALENT: NORMAL
DRSSO TEST METHOD: NORMAL

## 2024-08-28 ENCOUNTER — COMMITTEE REVIEW (OUTPATIENT)
Dept: TRANSPLANT | Facility: CLINIC | Age: 49
End: 2024-08-28
Payer: COMMERCIAL

## 2024-08-28 LAB
ATRIAL RATE - MUSE: 75 BPM
DIASTOLIC BLOOD PRESSURE - MUSE: NORMAL MMHG
INTERPRETATION ECG - MUSE: NORMAL
P AXIS - MUSE: 65 DEGREES
PR INTERVAL - MUSE: 174 MS
QRS DURATION - MUSE: 96 MS
QT - MUSE: 394 MS
QTC - MUSE: 439 MS
R AXIS - MUSE: -20 DEGREES
SYSTOLIC BLOOD PRESSURE - MUSE: NORMAL MMHG
T AXIS - MUSE: 20 DEGREES
VENTRICULAR RATE- MUSE: 75 BPM

## 2024-08-28 NOTE — COMMITTEE REVIEW
Kidney/Pancreas Committee Review Note     Evaluation Date: 2024  Committee Review Date: 2024    Organ being evaluated for: Kidney/Pancreas    Transplant Phase: Evaluation  Transplant Status: Active    Transplant Coordinator: Debby Gotti  Transplant Surgeon:        Referring Physician:      Primary Diagnosis:   Secondary Diagnosis:     Committee Review Members:  Nephrology Maryann Figueroa, CHAZ, Luca Yoon MD, Joshua Gan MD   Nurse Sebastien Baltazar, RN, JUVENCIO DENIS, RN   Nutrition Marilu Somers, RD   Pharmacy Emerald Bailey MA    - Clinical Mary Jo Johnson, MSW, Jason Prater, MSW, Syl Horn, MSW   Transplant JEREMY MAGUIRE, RN, Neeta Gatica, RN, Jaqueline Torres, RN, ROBINSON Martinez CNP, Saima Nunez, LUZ MARIA, Inna Cox, RN, Areli Singh, NP, Areli Farias, RN, Leslie Castañeda, RN, Lizzy Camilo, RN   Transplant Surgery Jey Alaniz MD       Transplant Eligibility: Insulin-dependent diabetes mellitus, Irreversible chronic kidney disease treated w/dialysis or expected need for dialysis    Committee Review Decision: Approved    Relative Contraindications:     Absolute Contraindications:      Committee Chair Jey Alaniz MD verbally attested to the committee's decision.    Committee Discussion Details: Reviewed patient's medical status and evaluation results to date with multidisciplinary committee. Committee determined that patient is a good candidate for kidney/pancreas transplant,  living or . Patient should have live donors register now to initiate donor evaluation.    The committee has recommended the following evaluation items be completed prior to being listed as active status on the kidney/pancreas transplant wait list:    Cardiology clearance - requesting an angiogram due to 20 years of DM history.  In person pancreas surgeon  Imaging review  Repeat cardiolipins  HMI: Dental    Patient is not a candidate for A2B - multi-organ  candidate    The patient will not be listed on the kidney/pancreas wait list  since they are on dialysis. The patient will be called and updated on the committee's decision, orders will be placed (if applicable), and summary letter will be sent.

## 2024-08-31 LAB
PROTOCOL CUTOFF: NORMAL
SA 1  COMMENTS: NORMAL
SA 1 CELL: NORMAL
SA 1 TEST METHOD: NORMAL
SA 2 CELL: NORMAL
SA 2 COMMENTS: NORMAL
SA 2 TEST METHOD: NORMAL
SA1 HI RISK ABY: NORMAL
SA1 MOD RISK ABY: NORMAL
SA2 HI RISK ABY: NORMAL
SA2 MOD RISK ABY: NORMAL
UNACCEPTABLE ANTIGENS: NORMAL
UNOS CPRA: 0

## 2024-09-09 ENCOUNTER — TELEPHONE (OUTPATIENT)
Dept: TRANSPLANT | Facility: CLINIC | Age: 49
End: 2024-09-09
Payer: COMMERCIAL

## 2024-09-09 NOTE — TELEPHONE ENCOUNTER
Patient Call: General  Route to LPN    Reason for call: Maria Eugenia BAINS from RN from [facility] [place] called to touch base about patient's positive results for Hep C. They had questions concerning results. More details with call back.     Call back needed? Yes    Return Call Needed  Same as documented in contacts section  When to return call?: Same day: Route High Priority

## 2024-09-10 DIAGNOSIS — N18.6 END STAGE RENAL DISEASE (H): Primary | ICD-10-CM

## 2024-09-10 DIAGNOSIS — Z76.82 ORGAN TRANSPLANT CANDIDATE: ICD-10-CM

## 2024-09-10 NOTE — TELEPHONE ENCOUNTER
Spoke with Maria Eugenia from [facility] - she was wondering why the hep B labs were drawn - explained it is apart of transplant work up. Pt may have had a recent hep b vaccination at dialysis. However, when RNCC looked in pt's records there was no vaccination records.  RNCC gave information on pt's dialysis unit since they probably have administered it. RNCC also placed records request for vaccinations through [facility]. Maria Eugenia will touch base if she is unable to get records on vaccinations.

## 2024-09-10 NOTE — TELEPHONE ENCOUNTER
Left VM stating that patient was not positive for Hep C on his transplant screening but did have a reactive test to Hep B - however, his core antibody screen was negative. RNCC was wondering if he may have had a recent Hep B vaccine -asked to call on main office line with any questions.

## 2024-09-18 ENCOUNTER — TELEPHONE (OUTPATIENT)
Dept: TRANSPLANT | Facility: CLINIC | Age: 49
End: 2024-09-18
Payer: COMMERCIAL

## 2024-09-18 DIAGNOSIS — I10 ESSENTIAL HYPERTENSION: ICD-10-CM

## 2024-09-18 DIAGNOSIS — Z01.818 ENCOUNTER FOR PRE-TRANSPLANT EVALUATION FOR KIDNEY AND PANCREAS TRANSPLANT: Primary | ICD-10-CM

## 2024-09-18 DIAGNOSIS — E10.9 TYPE 1 DIABETES MELLITUS (H): ICD-10-CM

## 2024-09-18 DIAGNOSIS — N18.6 END STAGE RENAL DISEASE (H): ICD-10-CM

## 2024-09-18 DIAGNOSIS — Z76.82 ORGAN TRANSPLANT CANDIDATE: ICD-10-CM

## 2024-09-18 NOTE — TELEPHONE ENCOUNTER
Called to discuss the outcome of transplant committee discussion, apologized for the delay, had been contacted by the [facility] about his reactive Hep B surface agn and needed to follow up with providers.  Pt was able to review the letter RNCC sent and has scheduled follow up appointments. Pt also had a hernia repair last week.  Cardiology risk assessment and clearance for kidney/pancreas transplant. Our team is requesting a coronary angiogram be completed due to your 20 year history of diabetes. 10/07, angiogram schedule  Abdominal imaging is requested to be reviewed by a transplant surgeon.  CT abdomen/pelvis and iliac ultrasound are needed. Scheduled 9/20  Repeat lab test for cardiolipins, you had a weak positive result that will need to be re-checked.  Up dated dental visit and clearance for transplant as part of your required health maintenance items.  Once the above are completed, a follow up visit with a kidney/pancreas transplant surgeon will be needed prior to activation on the transplant wait list. RNCC will place orders for return visit    Will also need a follow up Hep B surface agn since his initial was reactive per pt he has received 2 doses of the Hep B vaccine.

## 2024-09-19 ENCOUNTER — TRANSFERRED RECORDS (OUTPATIENT)
Dept: HEALTH INFORMATION MANAGEMENT | Facility: CLINIC | Age: 49
End: 2024-09-19
Payer: COMMERCIAL

## 2024-09-23 ENCOUNTER — TRANSFERRED RECORDS (OUTPATIENT)
Dept: HEALTH INFORMATION MANAGEMENT | Facility: CLINIC | Age: 49
End: 2024-09-23
Payer: COMMERCIAL

## 2024-09-30 ENCOUNTER — RESULTS ONLY (OUTPATIENT)
Dept: LAB | Facility: CLINIC | Age: 49
End: 2024-09-30
Payer: COMMERCIAL

## 2024-10-07 ENCOUNTER — TRANSFERRED RECORDS (OUTPATIENT)
Dept: HEALTH INFORMATION MANAGEMENT | Facility: CLINIC | Age: 49
End: 2024-10-07
Payer: COMMERCIAL

## 2024-10-08 LAB
INR (EXTERNAL) - DO NOT USE: 0.9 INR (ref 0.9–1.1)
Lab: 1.12 RATIO
PT - PROTHROMBINE TIME (EXTERNAL): 10.2 SEC (ref 9.4–12.5)
PTT (EXTERNAL): 26 SEC (ref 25–37)

## 2024-10-15 ENCOUNTER — TRANSFERRED RECORDS (OUTPATIENT)
Dept: HEALTH INFORMATION MANAGEMENT | Facility: CLINIC | Age: 49
End: 2024-10-15
Payer: COMMERCIAL

## 2024-11-12 ENCOUNTER — TEAM CONFERENCE (OUTPATIENT)
Dept: TRANSPLANT | Facility: CLINIC | Age: 49
End: 2024-11-12
Payer: COMMERCIAL

## 2024-11-12 NOTE — TELEPHONE ENCOUNTER
Image Review Meeting    ATTENDEES: Dr. Park    IMAGES REVIEWED: CT abd/pelvis 9/20/24 from OSH    DECISION: patient has adequate blood vessels for kidney/pancreas    INCIDENTALS: No

## 2024-11-18 ENCOUNTER — OFFICE VISIT (OUTPATIENT)
Dept: TRANSPLANT | Facility: CLINIC | Age: 49
End: 2024-11-18
Attending: NURSE PRACTITIONER
Payer: COMMERCIAL

## 2024-11-18 VITALS
SYSTOLIC BLOOD PRESSURE: 167 MMHG | BODY MASS INDEX: 23.66 KG/M2 | OXYGEN SATURATION: 97 % | DIASTOLIC BLOOD PRESSURE: 75 MMHG | HEART RATE: 81 BPM | WEIGHT: 160.2 LBS

## 2024-11-18 DIAGNOSIS — B18.1 HEPATITIS B, CHRONIC (H): Primary | ICD-10-CM

## 2024-11-18 DIAGNOSIS — I10 ESSENTIAL HYPERTENSION: ICD-10-CM

## 2024-11-18 DIAGNOSIS — E10.22 TYPE 1 DIABETES MELLITUS WITH CHRONIC KIDNEY DISEASE ON CHRONIC DIALYSIS (H): ICD-10-CM

## 2024-11-18 DIAGNOSIS — Z76.82 ORGAN TRANSPLANT CANDIDATE: ICD-10-CM

## 2024-11-18 DIAGNOSIS — N18.6 TYPE 1 DIABETES MELLITUS WITH CHRONIC KIDNEY DISEASE ON CHRONIC DIALYSIS (H): ICD-10-CM

## 2024-11-18 DIAGNOSIS — Z99.2 TYPE 1 DIABETES MELLITUS WITH CHRONIC KIDNEY DISEASE ON CHRONIC DIALYSIS (H): ICD-10-CM

## 2024-11-18 DIAGNOSIS — Z01.818 ENCOUNTER FOR PRE-TRANSPLANT EVALUATION FOR KIDNEY AND PANCREAS TRANSPLANT: ICD-10-CM

## 2024-11-18 DIAGNOSIS — N18.6 END STAGE RENAL DISEASE (H): ICD-10-CM

## 2024-11-18 PROCEDURE — 99213 OFFICE O/P EST LOW 20 MIN: CPT | Performed by: SURGERY

## 2024-11-18 RX ORDER — CALCITRIOL 0.5 UG/1
0.5 CAPSULE, LIQUID FILLED ORAL DAILY
COMMUNITY
Start: 2024-05-21

## 2024-11-18 RX ORDER — TORSEMIDE 20 MG/1
TABLET ORAL
COMMUNITY
Start: 2024-10-23

## 2024-11-18 NOTE — PROGRESS NOTES
Transplant Surgery Consult Note     Medical record number: [id number]  YOB: 1975,   Consult requested by Dr. Michell DILL  for evaluation of kidney and pancreas transplant candidacy.    Assessment and Recommendations: Mr. Metzger is a excellent candidate for transplantation and has a GOOD understanding of the risks and benefits of this approach to management of renal failure and diabetes. The following issues should be addressed prior to transplant:     Mr. Metzger has Chronic renal failure due to diabetes mellitus type 1 whose condition is not expected to resolve, is expected to progress, and is expected to continue to develop related comorbid conditions.  Cardiology consult for cardiac risk stratification to be ordered: Yes  CT abdomen and pelvis without contrast to be ordered for assessment of vascular targets: Yes  Transplant listing labs ordered to include HLA, ABOx2, Cr, etc.  Dietician consult ordered: Yes  Social work consult ordered: Yes  Imaging reports reviewed:  NO  Radiology images reviewed:NO  Recipient suitable to move forward with work up of living donors:  Yes    Excellent candidate for SPK, kidney, or kidney with [facility]. As he does not have live donors and is interested, recommend SPK. Complete workup and list.    Has had left lap inguinal hernia repair. If doing kidney first with [facility], will need kidney on RIGHT and pancreas intra-abdominally on LEFT. This is easily avoided with SPK however.     ABO B, CMV negative      The majority of our visit was spent in counselling, discussing the medical and surgical risks of living or  donor kidney and pancreas transplantation, either in a simultaneous or sequential fashion. I contrasted approximate wait time for SPK vs living vs  donor kidneys from normal (0-85%) or higher (%) kidney donor profile index (KDPI) donors and their associated outcomes. I would not recommend this individual to consider kidneys from high KDPI donors.  The reason for this decision is best summarized as: multi-organ transplant candidate.  Access to transplant will be impacted by living donor availability and overall candidacy for SPK, as well as the influence of blood type and degree of sensitization. We discussed advantages of preemptive transplant as well as living donor kidney transplant, and graft and patient survival outcomes associated with these options. Potential surgical complications of kidney and pancreas transplantation include bleeding, clotting, infection, wound complications, anastomotic failure and other issues such as cardiac complications, pneumonia, deep venous thrombosis, pulmonary embolism, post transplant diabetes and death. We discussed the need for protocol biopsy of the kidney and the possible need for a ureteral stent (and subsequent removal). We discussed benefits and risks associated with different approaches to exocrine drainage of pancreatic secretions. We also discussed differences in the average length of stay, recovery process, and posttransplant lab and monitoring protocol. We discussed the risk of graft rejection and recurrent diabetic nephropathy in the setting of poor glycemic control. I emphasized the need for strict immunosuppression adherence and the potential for complications of immunosuppression such as skin cancer or lymphoma, as well as a very low but not zero risk of donor-derived disease transmission risks (infection, cancer). Mr. Metzger asked good questions and the patient's candidacy will be reviewed at our Multidisciplinary Selection Committee. Thank you for the opportunity to participate in Mr. Metzger's care.    Total time: 30 minutes  Counselling time: 30 minutes    Hiram Huerta MD  ---------------------------------------------------------------------------------------------------    HPI: Mr. Metzger has End stage renal failure due to diabetes mellitus type 1. The patient has had diabetes for 33 years.  Management is by Humalog per diabetic educator. The patient usually checks his blood sugar HAS NINA MANY  times/day.  Daily blood glucoses range typically from 70 to 200.  Hypoglyemic unawareness is not an issue.  The diabetes is controlled.    Complications of diabetes include:    Retinopathy:  Yes   Neuropathy: Yes   Gastroparesis:  No    The patient is on dialysis.    Has potential kidney donors:  No.  Interested in participation in paired exchange if a donor is willing: Yes     The patient has the following pertinent history:       No    Yes  Dialysis:    []      [] via:       Blood Transfusion                  []      []  Number of units:   Most recently:  Pregnancy:    []      [] Number:       Previous Transplant:  []      [] Details:    Cancer    []      [] Comment:   Kidney stones   []      [] Comment:      Recurrent infections  []      []  Type:                  Bladder dysfunction  []      [] Cause:    Claudication   []      [] Distance:    Previous Amputation  []      [] Cause:     Chronic anticoagulation  []      [] Indication:  Church  []      []     Past Medical History:   Diagnosis Date    End stage renal disease (H)     Hypertension     Metabolic acidosis     Mixed hyperlipidemia     Type 1 diabetes mellitus (H)     Vitamin D deficiency      No past surgical history on file.  Family History   Problem Relation Age of Onset    Diabetes Type 1 Mother     Valvular heart disease Father     Pacemaker Father     Diabetes Sister     Diabetes Type 1 Maternal Grandfather     Skin Cancer Maternal Uncle     Cancer No family hx of      Social History     Socioeconomic History    Marital status: Single     Spouse name: Not on file    Number of children: Not on file    Years of education: Not on file    Highest education level: Not on file   Occupational History    Not on file   Tobacco Use    Smoking status: Never    Smokeless tobacco: Never   Substance and Sexual Activity    Alcohol use: Never    Drug  use: Never    Sexual activity: Not on file   Other Topics Concern    Parent/sibling w/ CABG, MI or angioplasty before 65F 55M? Yes     Comment: Father multiple valves replaced and has a place maker   Social History Narrative    Not on file     Social Drivers of Health     Financial Resource Strain: Not on file   Food Insecurity: Not on file   Transportation Needs: Not on file   Physical Activity: Not on file   Stress: Not on file   Social Connections: Not on file   Interpersonal Safety: Not on file   Housing Stability: Not on file       ROS:   CONSTITUTIONAL:  No fevers or chills  EYES: negative for icterus  ENT:  negative for hearing loss, tinnitus and sore throat  RESPIRATORY:  negative for cough, sputum, dyspnea  CARDIOVASCULAR:  negative for chest pain Fatigue  GASTROINTESTINAL:  negative for nausea, vomiting, diarrhea or constipation  GENITOURINARY:  negative for incontinence, dysuria, bladder emptying problems  HEME:  No easy bruising  INTEGUMENT:  negative for rash and pruritus  NEURO:  Negative for headache, seizure disorder    Allergies:   No Known Allergies    Medications:  Prescription Medications as of 11/18/2024         Rx Number Disp Refills Start End Last Dispensed Date Next Fill Date Owning Pharmacy    ACCU-CHEK GUIDE test strip  -- -- 8/7/2024 --       Sig: USE 1 STRIP FOUR TIMES DAILY FOR TESTING    Class: Historical    BD PEN NEEDLE ANTOINETTE 2ND GEN 32G X 4 MM miscellaneous  -- -- 8/9/2024 --       Sig: USE FOUR TIMES DAILY TO INJECT INSULIN.    Class: Historical    calcitRIOL (ROCALTROL) 0.5 MCG capsule  -- -- 5/21/2024 --       Sig: Take 0.5 mcg by mouth daily.    Class: Historical    Route: Oral    calcium acetate (PHOSLO) 667 MG CAPS capsule  -- -- 7/12/2024 --       Sig: TAKE 3 CAPSULES BY MOUTH THREE TIMES DAILY WITH MEALS    Class: Historical    Continuous Glucose  (FREESTYLE NINA 3 READER) NA  -- -- 8/8/2024 --       Sig: See Admin Instructions.    Class: Historical    Continuous  Glucose Sensor (FREESTYLE NINA 3 SENSOR) [facility]  -- -- 2024 --       Sig: USE FOR GLUCOSE MONITORING CHANGE EVERY 14 DAYS    Class: Historical    insulin lispro (HUMALOG KWIKPEN) 100 UNIT/ML (1 unit dial) [facility]  -- -- 2024 --       Sig: ADMINISTER 10 UNITS UNDER THE SKIN THREE TIMES DAILY    Class: Historical    LANTUS SOLOSTAR 100 UNIT/ML soln  -- -- 2024 --       Sig: ADMINISTER 6 UNITS UNDER THE SKIN EVERY EVENING    Class: Historical    lisinopril (ZESTRIL) 20 MG tablet  -- -- 2024 --       Si mg.    Class: Historical    Methoxy PEG-Epoetin Beta (MIRCERA) 120 MCG/0.3ML SOSY  -- --  --       Sig: Inject as directed.    Class: Historical    Route: Injection    multivitamin RENAL (RENAVITE RX/NEPHROVITE) 1 tablet tablet  -- --  --       Sig: Take 1 tablet by mouth daily.    Class: Historical    Route: Oral    rosuvastatin (CRESTOR) 20 MG tablet  -- -- 2024 --       Sig: Take 1 tablet by mouth daily at 2 pm.    Class: Historical    Route: Oral    sodium bicarbonate 650 MG tablet  -- -- 2024 --       Sig: Take 1 tablet by mouth 2 times daily.    Class: Historical    Route: Oral    torsemide (DEMADEX) 20 MG tablet  -- -- 10/23/2024 --       Sig: TAKE 3 TABLETS BY MOUTH EVERY 2 DAYS    Class: Historical    Vitamin D3 (VITAMIN D, CHOLECALCIFEROL,) 25 mcg (1000 units) tablet  -- --  --       Sig: Take by mouth daily.    Class: Historical    Route: Oral    study - methoxy PEG-epoetin beta, MIRCERA, (IDS# [id number]) 120 MCG/0.3ML injection  -- --  --       Sig: Inject 120 mcg subcutaneously.    Class: Historical    Route: Subcutaneous            Exam:   Pulse:  [81] 81  BP: (167)/(75) 167/75  SpO2:  [97 %] 97 %  Appearance: in no apparent distress.   Skin: normal  Head and Neck: Normal, no rashes or jaundice  Respiratory: easy respirations, no audible wheezing.  Cardiovascular: RRR  Abdomen: protuberant, Normal bowel sounds     Neuro: without deficit     Diagnostics:   No results found for this  or any previous visit (from the past 4 weeks).  [facility] CPRA   Date Value Ref Range Status   08/21/2024 0  Final

## 2024-11-18 NOTE — LETTER
2024      Saul Metzger  [address] [address]      Dear Colleague,    Thank you for referring your patient, Saul Metzger, to the [facility]. Please see a copy of my visit note below.    Transplant Surgery Consult Note     Medical record number: [id number]  YOB: 1975,   Consult requested by Dr. Michell DILL  for evaluation of kidney and pancreas transplant candidacy.    Assessment and Recommendations: Mr. Metzger is a excellent candidate for transplantation and has a GOOD understanding of the risks and benefits of this approach to management of renal failure and diabetes. The following issues should be addressed prior to transplant:     Mr. Metzger has Chronic renal failure due to diabetes mellitus type 1 whose condition is not expected to resolve, is expected to progress, and is expected to continue to develop related comorbid conditions.  Cardiology consult for cardiac risk stratification to be ordered: Yes  CT abdomen and pelvis without contrast to be ordered for assessment of vascular targets: Yes  Transplant listing labs ordered to include HLA, ABOx2, Cr, etc.  Dietician consult ordered: Yes  Social work consult ordered: Yes  Imaging reports reviewed:  NO  Radiology images reviewed:NO  Recipient suitable to move forward with work up of living donors:  Yes    Excellent candidate for SPK, kidney, or kidney with [facility]. As he does not have live donors and is interested, recommend SPK. Complete workup and list.    Has had left lap inguinal hernia repair. If doing kidney first with [facility], will need kidney on RIGHT and pancreas intra-abdominally on LEFT. This is easily avoided with SPK however.     ABO B, CMV negative      The majority of our visit was spent in counselling, discussing the medical and surgical risks of living or  donor kidney and pancreas transplantation, either in a simultaneous or sequential fashion. I contrasted approximate wait time  for SPK vs living vs  donor kidneys from normal (0-85%) or higher (%) kidney donor profile index (KDPI) donors and their associated outcomes. I would not recommend this individual to consider kidneys from high KDPI donors. The reason for this decision is best summarized as: multi-organ transplant candidate.  Access to transplant will be impacted by living donor availability and overall candidacy for SPK, as well as the influence of blood type and degree of sensitization. We discussed advantages of preemptive transplant as well as living donor kidney transplant, and graft and patient survival outcomes associated with these options. Potential surgical complications of kidney and pancreas transplantation include bleeding, clotting, infection, wound complications, anastomotic failure and other issues such as cardiac complications, pneumonia, deep venous thrombosis, pulmonary embolism, post transplant diabetes and death. We discussed the need for protocol biopsy of the kidney and the possible need for a ureteral stent (and subsequent removal). We discussed benefits and risks associated with different approaches to exocrine drainage of pancreatic secretions. We also discussed differences in the average length of stay, recovery process, and posttransplant lab and monitoring protocol. We discussed the risk of graft rejection and recurrent diabetic nephropathy in the setting of poor glycemic control. I emphasized the need for strict immunosuppression adherence and the potential for complications of immunosuppression such as skin cancer or lymphoma, as well as a very low but not zero risk of donor-derived disease transmission risks (infection, cancer). Mr. Metzger asked good questions and the patient's candidacy will be reviewed at our Multidisciplinary Selection Committee. Thank you for the opportunity to participate in Mr. Metzger's care.    Total time: 30 minutes  Counselling time: 30 minutes    Hiram  MD Bradley  ---------------------------------------------------------------------------------------------------    HPI: Mr. Metzger has End stage renal failure due to diabetes mellitus type 1. The patient has had diabetes for 33 years. Management is by Humalog per diabetic educator. The patient usually checks his blood sugar HAS NINA MANY  times/day.  Daily blood glucoses range typically from 70 to 200.  Hypoglyemic unawareness is not an issue.  The diabetes is controlled.    Complications of diabetes include:    Retinopathy:  Yes   Neuropathy: Yes   Gastroparesis:  No    The patient is on dialysis.    Has potential kidney donors:  No.  Interested in participation in paired exchange if a donor is willing: Yes     The patient has the following pertinent history:       No    Yes  Dialysis:    []      [] via:       Blood Transfusion                  []      []  Number of units:   Most recently:  Pregnancy:    []      [] Number:       Previous Transplant:  []      [] Details:    Cancer    []      [] Comment:   Kidney stones   []      [] Comment:      Recurrent infections  []      []  Type:                  Bladder dysfunction  []      [] Cause:    Claudication   []      [] Distance:    Previous Amputation  []      [] Cause:     Chronic anticoagulation  []      [] Indication:  Yazidi  []      []     Past Medical History:   Diagnosis Date     End stage renal disease (H)      Hypertension      Metabolic acidosis      Mixed hyperlipidemia      Type 1 diabetes mellitus (H)      Vitamin D deficiency      No past surgical history on file.  Family History   Problem Relation Age of Onset     Diabetes Type 1 Mother      Valvular heart disease Father      Pacemaker Father      Diabetes Sister      Diabetes Type 1 Maternal Grandfather      Skin Cancer Maternal Uncle      Cancer No family hx of      Social History     Socioeconomic History     Marital status: Single     Spouse name: Not on file     Number of children:  Not on file     Years of education: Not on file     Highest education level: Not on file   Occupational History     Not on file   Tobacco Use     Smoking status: Never     Smokeless tobacco: Never   Substance and Sexual Activity     Alcohol use: Never     Drug use: Never     Sexual activity: Not on file   Other Topics Concern     Parent/sibling w/ CABG, MI or angioplasty before 65F 55M? Yes     Comment: Father multiple valves replaced and has a place maker   Social History Narrative     Not on file     Social Drivers of Health     Financial Resource Strain: Not on file   Food Insecurity: Not on file   Transportation Needs: Not on file   Physical Activity: Not on file   Stress: Not on file   Social Connections: Not on file   Interpersonal Safety: Not on file   Housing Stability: Not on file       ROS:   CONSTITUTIONAL:  No fevers or chills  EYES: negative for icterus  ENT:  negative for hearing loss, tinnitus and sore throat  RESPIRATORY:  negative for cough, sputum, dyspnea  CARDIOVASCULAR:  negative for chest pain Fatigue  GASTROINTESTINAL:  negative for nausea, vomiting, diarrhea or constipation  GENITOURINARY:  negative for incontinence, dysuria, bladder emptying problems  HEME:  No easy bruising  INTEGUMENT:  negative for rash and pruritus  NEURO:  Negative for headache, seizure disorder    Allergies:   No Known Allergies    Medications:  Prescription Medications as of 11/18/2024         Rx Number Disp Refills Start End Last Dispensed Date Next Fill Date Owning Pharmacy    ACCU-CHEK GUIDE test strip  -- -- 8/7/2024 --       Sig: USE 1 STRIP FOUR TIMES DAILY FOR TESTING    Class: Historical    BD PEN NEEDLE ANTOINETTE 2ND GEN 32G X 4 MM miscellaneous  -- -- 8/9/2024 --       Sig: USE FOUR TIMES DAILY TO INJECT INSULIN.    Class: Historical    calcitRIOL (ROCALTROL) 0.5 MCG capsule  -- -- 5/21/2024 --       Sig: Take 0.5 mcg by mouth daily.    Class: Historical    Route: Oral    calcium acetate (PHOSLO) 667 MG CAPS  capsule  -- -- 2024 --       Sig: TAKE 3 CAPSULES BY MOUTH THREE TIMES DAILY WITH MEALS    Class: Historical    Continuous Glucose  (FREESTYLE NINA 3 READER) NA  -- -- 2024 --       Sig: See Admin Instructions.    Class: Historical    Continuous Glucose Sensor (FREESTYLE NINA 3 SENSOR) [facility] [place]  -- -- 2024 --       Sig: USE FOR GLUCOSE MONITORING CHANGE EVERY 14 DAYS    Class: Historical    insulin lispro (HUMALOG KWIKPEN) 100 UNIT/ML (1 unit dial) [facility]PEN  -- -- 2024 --       Sig: ADMINISTER 10 UNITS UNDER THE SKIN THREE TIMES DAILY    Class: Historical    LANTUS SOLOSTAR 100 UNIT/ML soln  -- -- 2024 --       Sig: ADMINISTER 6 UNITS UNDER THE SKIN EVERY EVENING    Class: Historical    lisinopril (ZESTRIL) 20 MG tablet  -- -- 2024 --       Si mg.    Class: Historical    Methoxy PEG-Epoetin Beta (MIRCERA) 120 MCG/0.3ML SOSY  -- --  --       Sig: Inject as directed.    Class: Historical    Route: Injection    multivitamin RENAL (RENAVITE RX/NEPHROVITE) 1 tablet tablet  -- --  --       Sig: Take 1 tablet by mouth daily.    Class: Historical    Route: Oral    rosuvastatin (CRESTOR) 20 MG tablet  -- -- 2024 --       Sig: Take 1 tablet by mouth daily at 2 pm.    Class: Historical    Route: Oral    sodium bicarbonate 650 MG tablet  -- -- 2024 --       Sig: Take 1 tablet by mouth 2 times daily.    Class: Historical    Route: Oral    torsemide (DEMADEX) 20 MG tablet  -- -- 10/23/2024 --       Sig: TAKE 3 TABLETS BY MOUTH EVERY 2 DAYS    Class: Historical    Vitamin D3 (VITAMIN D, CHOLECALCIFEROL,) 25 mcg (1000 units) tablet  -- --  --       Sig: Take by mouth daily.    Class: Historical    Route: Oral    study - methoxy PEG-epoetin beta, MIRCERA, (IDS# [id number]) 120 MCG/0.3ML injection  -- --  --       Sig: Inject 120 mcg subcutaneously.    Class: Historical    Route: Subcutaneous            Exam:   Pulse:  [81] 81  BP: (167)/(75) 167/75  SpO2:  [97 %] 97 %  Appearance: in no  apparent distress.   Skin: normal  Head and Neck: Normal, no rashes or jaundice  Respiratory: easy respirations, no audible wheezing.  Cardiovascular: RRR  Abdomen: protuberant, Normal bowel sounds     Neuro: without deficit     Diagnostics:   No results found for this or any previous visit (from the past 4 weeks).  UNOS CPRA   Date Value Ref Range Status   08/21/2024 0  Final          Again, thank you for allowing me to participate in the care of your patient.        Sincerely,        Hiram Huerta MD

## 2024-11-19 ENCOUNTER — TRANSFERRED RECORDS (OUTPATIENT)
Dept: HEALTH INFORMATION MANAGEMENT | Facility: CLINIC | Age: 49
End: 2024-11-19
Payer: COMMERCIAL

## 2024-12-15 ENCOUNTER — HEALTH MAINTENANCE LETTER (OUTPATIENT)
Age: 49
End: 2024-12-15

## 2025-01-28 ENCOUNTER — TRANSFERRED RECORDS (OUTPATIENT)
Dept: HEALTH INFORMATION MANAGEMENT | Facility: CLINIC | Age: 50
End: 2025-01-28
Payer: MEDICARE

## 2025-02-16 ENCOUNTER — HEALTH MAINTENANCE LETTER (OUTPATIENT)
Age: 50
End: 2025-02-16

## 2025-03-14 ENCOUNTER — TRANSFERRED RECORDS (OUTPATIENT)
Dept: HEALTH INFORMATION MANAGEMENT | Facility: CLINIC | Age: 50
End: 2025-03-14
Payer: MEDICARE

## 2025-03-16 ENCOUNTER — HEALTH MAINTENANCE LETTER (OUTPATIENT)
Age: 50
End: 2025-03-16

## 2025-03-20 DIAGNOSIS — B18.1 HEPATITIS B, CHRONIC (H): Primary | ICD-10-CM

## 2025-04-03 ENCOUNTER — TELEPHONE (OUTPATIENT)
Dept: TRANSPLANT | Facility: CLINIC | Age: 50
End: 2025-04-03
Payer: MEDICARE

## 2025-04-03 DIAGNOSIS — B18.1 HEPATITIS B, CHRONIC (H): Primary | ICD-10-CM

## 2025-04-03 NOTE — TELEPHONE ENCOUNTER
Spoke with pt about the results of the transplant committee review, transplant still needs a couple of records - hep B DNA and hep B vaccination records.  Pt will need an updated PRA as well.  Will call pt's HD center since pt did not know where the records are.  Once records are in and deemed appropriate, will be ready to activate.

## 2025-04-15 ENCOUNTER — LAB (OUTPATIENT)
Dept: LAB | Facility: CLINIC | Age: 50
End: 2025-04-15
Payer: MEDICARE

## 2025-04-15 DIAGNOSIS — Z76.82 AWAITING ORGAN TRANSPLANT: ICD-10-CM

## 2025-04-15 DIAGNOSIS — B18.1 HEPATITIS B, CHRONIC (H): ICD-10-CM

## 2025-04-22 ENCOUNTER — TRANSFERRED RECORDS (OUTPATIENT)
Dept: HEALTH INFORMATION MANAGEMENT | Facility: CLINIC | Age: 50
End: 2025-04-22
Payer: MEDICARE

## 2025-04-22 DIAGNOSIS — Z76.82 AWAITING ORGAN TRANSPLANT: ICD-10-CM

## 2025-04-22 DIAGNOSIS — B18.1 HEPATITIS B, CHRONIC (H): Primary | ICD-10-CM

## 2025-05-21 ENCOUNTER — MEDICAL CORRESPONDENCE (OUTPATIENT)
Dept: HEALTH INFORMATION MANAGEMENT | Facility: CLINIC | Age: 50
End: 2025-05-21
Payer: MEDICARE

## 2025-05-21 ENCOUNTER — TELEPHONE (OUTPATIENT)
Dept: TRANSPLANT | Facility: CLINIC | Age: 50
End: 2025-05-21
Payer: MEDICARE

## 2025-05-21 DIAGNOSIS — B18.1 HEPATITIS B, CHRONIC (H): Primary | ICD-10-CM

## 2025-05-21 NOTE — LETTER
PHYSICIAN ORDERS      DATE & TIME ISSUED: May 21, 2025 9:04 AM  PATIENT NAME: Saul Metzger   : 1975     [facility] MR# [if applicable]: [id number]     DIAGNOSIS:  Z76.82  ICD-10 CODE: N 18.       Please draw the following  Cardiolipins [id number]    Any questions please call: Debby Gotti [phone]    Please fax each result same day as resulted/available    Critical lab results page [phone].      Danielle Todd MD FACS  Associate Professor of Surgery  Director, Living Kidney Donor Program.

## 2025-05-21 NOTE — TELEPHONE ENCOUNTER
Called Saul to follow up on his myTips message asking if he is close to being listed. Spoke to dialysis unit and he was in the midst of getting his Hepatitis B series when we elidia his virology panel here at his evaluation. Hepatitis B surface antibody was positive at that time. Dialysis will fax updated version of vaccines and hepatitis B profile. Saul had weak cardiolipins and that needs repeat. Will fax orders to his PCP. Saul will send a myTips message when these labs are drawn so we can obtain the results. Also will call his dental clinic for clearance letter. Once all of the above information is here he should be ready for active status.

## 2025-06-06 ENCOUNTER — TELEPHONE (OUTPATIENT)
Dept: TRANSPLANT | Facility: CLINIC | Age: 50
End: 2025-06-06
Payer: MEDICARE

## 2025-06-06 DIAGNOSIS — B18.1 HEPATITIS B, CHRONIC (H): Primary | ICD-10-CM

## 2025-06-06 NOTE — TELEPHONE ENCOUNTER
Patient Call: Voicemail  Date/Time: 6/6/2025  [id number]  Reason for call: I called last week. I left a message, and no one's called me back yet, and I've got on my chart last week and left all me messages, nothing back. I need to get some lab stuff uh order sent to towards to my doc primary doctor. I've been supposed to be she's supposed to be dealing with it, but I haven't heard nothing back from her. Thanks bye.

## 2025-06-19 ENCOUNTER — TELEPHONE (OUTPATIENT)
Dept: TRANSPLANT | Facility: CLINIC | Age: 50
End: 2025-06-19
Payer: MEDICARE

## 2025-06-19 DIAGNOSIS — Z01.818 ENCOUNTER FOR PRE-TRANSPLANT EVALUATION FOR KIDNEY AND PANCREAS TRANSPLANT: ICD-10-CM

## 2025-06-19 DIAGNOSIS — Z76.82 ORGAN TRANSPLANT CANDIDATE: ICD-10-CM

## 2025-06-19 DIAGNOSIS — N18.6 END STAGE RENAL DISEASE (H): ICD-10-CM

## 2025-06-19 DIAGNOSIS — B18.1 HEPATITIS B, CHRONIC (H): Primary | ICD-10-CM

## 2025-06-19 NOTE — TELEPHONE ENCOUNTER
Called patient to inform them of status change to ACTIVE on the Kidney-Pancreas list today 25. Status change letter and PRA orders to be mailed. Patient is in agreement with listing ACTIVE status.      Discussed:   - Pt is eligible for  donor offers and pt can receive calls 24 hours a day, 7 days a week, and on call staff need to be able to reach pt or one of emergency contacts within 30 minutes or they might skip over to next recipient on list.   -Please make sure your phone is charged at all times and your voicemail is not full   -Your transplant on call coordinator will give you directions about when and where you will need to come to the hospital for transplant  -The transplant coordinator will call you to discuss your current health status, the offer, and plans to move forward.  Some organ offer calls will require you to come to the hospital immediately; some may not be as time sensitive.  It may be possible for you to come to the hospital and, for various reasons, the transplant could be cancelled.  This is often called a  dry run .  - Reviewed the right to accept or decline offer, without penalty  - Expected length of surgical procedure is about 4-6 hours, expected inpatient length of stay post transplant is 3-4 days, and potential to stay locally post transplant. Offered resources for lodging in the area  - Verified contact information as documented in Epic. Confirmed emergency contact information  -Instructed patient about importance of having PRAs drawn every 3 months via: (Mailers/[facility]). Encouraged them to set reminder in their preferred calendar to stay on top of their quarterly labs  -Reminded pt to stay up on health maintenance and to call with any updates on their health status, insurance or contact information.   -Reminded pt to contact coordinator prior to receiving any blood transfusions as it may change PRA level and make it more difficult to match potential donors    -Donor website  was provided     -Last PRA was 0 on 4/15     -Provided name and contact information for additional questions or concerns.  Pt expressed excellent understanding of all and was in good agreement with the plan.       RNCC and patient reviewed the education videos together.  The [facility] website pre-transplant modules were viewed:     Content reviewed:  Living Donation and how to access that program  Paired exchange  Kidney Donor Profile Index (KDPI)  Waiting list issues (right to decline without penalty, high PHS risk donors, what to expect when called with an offer)  Hepatitis C Donor Acceptance education and risks  Hospital experience, length of stay, need to stay locally post-discharge (2-4 weeks)    Surgical complications with video                      Post-surgery lifting and driving restrictions  Post-transplant routines, frequency of lab work and clinic visits  Role of Transplant Coordinator    Participants were informed of the benefits of transplant as well as potential risks such as infection, cancer, and death. The need for total adherence with immunosuppression medications and following transplant regimens was stressed.  The overall evaluation/approval/listing process was reviewed.

## 2025-06-23 ENCOUNTER — TELEPHONE (OUTPATIENT)
Dept: TRANSPLANT | Facility: CLINIC | Age: 50
End: 2025-06-23
Payer: MEDICARE

## 2025-06-23 DIAGNOSIS — B18.1 HEPATITIS B, CHRONIC (H): Primary | ICD-10-CM

## 2025-06-23 NOTE — TELEPHONE ENCOUNTER
Message to be sent to Dr. Pro to order Cologard testing as 03/14/2025 colonoscopy had unsatisfactory prep.  Patient aware.

## 2025-06-23 NOTE — TELEPHONE ENCOUNTER
"Called pt to introduce myself as WL coordinator and encouraged pt to stay up on health maintenance and to let us know if insurance changes, contact info updates. Noted since patient's colonoscopy noted \"unsatisfactory prep,\" our transplant provider does recommend patient have Cologard testing, if hasn't had.  Patient reports he hasn't had Cologard testing, so will contact primary care provider to request provider order the testing.  Noted, f the result is negative, then patient may wait to repeat the colonoscopy in 5 years.  If the result is positive, noted patient will need to repeat the colonoscopy now.  Explained WL protocol for pt's status and when follow up is needed. Gave pt direct information and encouraged to reach out with any questions/concerns.     "

## 2025-06-24 ENCOUNTER — DOCUMENTATION ONLY (OUTPATIENT)
Dept: TRANSPLANT | Facility: CLINIC | Age: 50
End: 2025-06-24
Payer: MEDICARE

## 2025-06-24 DIAGNOSIS — B18.1 HEPATITIS B, CHRONIC (H): Primary | ICD-10-CM

## 2025-06-29 ENCOUNTER — HEALTH MAINTENANCE LETTER (OUTPATIENT)
Age: 50
End: 2025-06-29